# Patient Record
Sex: FEMALE
[De-identification: names, ages, dates, MRNs, and addresses within clinical notes are randomized per-mention and may not be internally consistent; named-entity substitution may affect disease eponyms.]

---

## 2017-04-27 ENCOUNTER — HOSPITAL ENCOUNTER (EMERGENCY)
Dept: HOSPITAL 25 - UCCORT | Age: 22
Discharge: HOME | End: 2017-04-27
Payer: COMMERCIAL

## 2017-04-27 VITALS — DIASTOLIC BLOOD PRESSURE: 70 MMHG | SYSTOLIC BLOOD PRESSURE: 129 MMHG

## 2017-04-27 DIAGNOSIS — I10: ICD-10-CM

## 2017-04-27 DIAGNOSIS — J02.0: Primary | ICD-10-CM

## 2017-04-27 DIAGNOSIS — E66.9: ICD-10-CM

## 2017-04-27 DIAGNOSIS — Z88.1: ICD-10-CM

## 2017-04-27 DIAGNOSIS — Z88.2: ICD-10-CM

## 2017-04-27 DIAGNOSIS — Z90.49: ICD-10-CM

## 2017-04-27 PROCEDURE — G0463 HOSPITAL OUTPT CLINIC VISIT: HCPCS

## 2017-04-27 PROCEDURE — 87651 STREP A DNA AMP PROBE: CPT

## 2017-04-27 PROCEDURE — 99202 OFFICE O/P NEW SF 15 MIN: CPT

## 2017-04-27 NOTE — UC
Throat Pain/Nasal Etienne HPI





- HPI Summary


HPI Summary: 





fever and sore throat for 2 days





- History of Current Complaint


Chief Complaint: UCRespiratory


Stated Complaint: SORE THROAT


Time Seen by Provider: 04/27/17 20:21


Hx Obtained From: Patient


Hx Last Menstrual Period: 3/16/17


Pregnant?: No


Onset/Duration: Sudden Onset, Lasting Days


Severity: Moderate


Cough: Nonproductive


Associated Signs & Symptoms: Positive: Dysphagia, Fever





- Epiglottits Risk Factors


Epiglottis Risk Factors: Negative





- Allergies/Home Medications


Allergies/Adverse Reactions: 


 Allergies











Allergy/AdvReac Type Severity Reaction Status Date / Time


 


Amoxicillin Allergy  Rash Verified 04/27/17 19:59


 


Sulfa Antibiotics Allergy  Rash Verified 04/27/17 19:59











Home Medications: 


 Home Medications





GuaiFENesin DM* [Robitussin DM*] 10 ml PO Q4H PRN 04/27/17 [History Confirmed 04 /27/17]


Ibuprofen TAB* [Motrin TAB* 800 MG] 800 mg PO TID 04/27/17 [History Confirmed 04 /27/17]


Losartan TAB* [Cozaar TAB*] 50 mg PO QPM 04/27/17 [History Confirmed 04/27/17]


Menthol (Mouth-Throat) [Cepacol Sore Throat] 10.6 mg MT ONCE 04/27/17 [History 

Confirmed 04/27/17]


Norgestimate-Ethinyl Estradiol [Trinessa 0.18/0.215/0.25 mg-35 Mcg] 1 tab PO 

QPM 04/27/17 [History Confirmed 04/27/17]


Propranolol TAB* [Inderal TAB*] 10 mg PO QPM 04/27/17 [History Confirmed 04/27/ 17]


buPROPion TAB* [Wellbutrin TAB*] 100 mg PO QPM 04/27/17 [History Confirmed 04/27 /17]











PMH/Surg Hx/FS Hx/Imm Hx


Previously Healthy: Yes


Cardiovascular History Of: Reports: Hypertension





- Surgical History


Surgical History: Yes


Surgery Procedure, Year, and Place: gallbladder 8/2016





- Family History


Known Family History: 


   Negative: Cardiac Disease





- Social History


Alcohol Use: Rare


Substance Use Type: None


Smoking Status (MU): Never Smoked Tobacco





Review of Systems


Constitutional: Fever


Skin: Negative


Eyes: Negative


ENT: Nasal Discharge


Respiratory: Cough


Cardiovascular: Negative


Gastrointestinal: Negative


Genitourinary: Negative


Motor: Negative


Neurovascular: Negative


Musculoskeletal: Negative


Neurological: Negative


Psychological: Negative


All Other Systems Reviewed And Are Negative: Yes





Physical Exam


Triage Information Reviewed: Yes


Appearance: Ill-Appearing, Pain Distress, Obese


Vital Signs: 


 Initial Vital Signs











Temp  100.4 F   04/27/17 19:52


 


Pulse  92   04/27/17 19:52


 


Resp  20   04/27/17 19:52


 


BP  129/70   04/27/17 19:52


 


Pulse Ox  100   04/27/17 19:52











Vital Signs Reviewed: Yes


Eye Exam: Normal


Eyes: Positive: Conjunctiva Clear


ENT: Positive: Normal ENT inspection, Hearing grossly normal, Pharynx normal, 

TMs normal


Dental Exam: Normal


Neck exam: Normal


Neck: Positive: Supple, Nontender, No Lymphadenopathy


Respiratory Exam: Normal


Respiratory: Positive: Chest non-tender, Lungs clear, Normal breath sounds


Cardiovascular Exam: Normal


Cardiovascular: Positive: RRR, No Murmur, Pulses Normal


Abdominal Exam: Normal


Abdomen Description: Positive: Nontender, No Organomegaly, Soft


Bowel Sounds: Positive: Present


Musculoskeletal Exam: Normal


Musculoskeletal: Positive: Strength Intact, ROM Intact, No Edema


Neurological Exam: Normal


Neurological: Positive: Alert, Muscle Tone Normal


Psychological Exam: Normal


Skin Exam: Normal





Throat Pain/Nasal Course/Dx





- Course


Course Of Treatment: hx obtained, exam performed, meds reviewed, rapid strep is 

positive, treated with keflex





- Differential Dx/Diagnosis


Differential Diagnosis/HQI/PQRI: Influenza, Laryngitis, Peritonsillar Abscess, 

Pharyngitis, Sinusitis


Provider Diagnoses: strep pharyngitis





Discharge





- Discharge Plan


Condition: Stable


Disposition: HOME


Prescriptions: 


Cephalexin CAP* [Keflex CAP*] 500 mg PO BID #20 cap


Patient Education Materials:  Strep Throat (ED)


Referrals: 


Veronica Wong MD [Primary Care Provider] - 


Additional Instructions: 


1. take the medication as prescribed


2. Increase your fluid intake and get plenty of rest.

## 2019-01-08 ENCOUNTER — HOSPITAL ENCOUNTER (EMERGENCY)
Dept: HOSPITAL 25 - UCCORT | Age: 24
Discharge: HOME | End: 2019-01-08
Payer: COMMERCIAL

## 2019-01-08 VITALS — DIASTOLIC BLOOD PRESSURE: 77 MMHG | SYSTOLIC BLOOD PRESSURE: 144 MMHG

## 2019-01-08 DIAGNOSIS — Z20.2: Primary | ICD-10-CM

## 2019-01-08 DIAGNOSIS — Z88.1: ICD-10-CM

## 2019-01-08 DIAGNOSIS — Z88.0: ICD-10-CM

## 2019-01-08 PROCEDURE — G0463 HOSPITAL OUTPT CLINIC VISIT: HCPCS

## 2019-01-08 PROCEDURE — 99211 OFF/OP EST MAY X REQ PHY/QHP: CPT

## 2019-01-08 PROCEDURE — 87591 N.GONORRHOEAE DNA AMP PROB: CPT

## 2019-01-08 PROCEDURE — 87491 CHLMYD TRACH DNA AMP PROBE: CPT

## 2019-01-08 NOTE — XMS REPORT
Continuity of Care Document (CCD)

 Created on:2018



Patient:Debra Nowak

Sex:Female

:1995

External Reference #:[id number]





Demographics







 Address  [address] [place] [address]

 

 Home Phone  [phone]

 

 Mobile Phone  [phone]

 

 Email Address  [email or web address]

 

 Preferred Language  en

 

 Marital Status  Not  or 

 

 Voodoo Affiliation  Unknown

 

 Race  White

 

 Ethnic Group  Not  or 









Author







 Name  Veronica Wong MD

 

 Address  [address] [place] [address]









Support







 Name  Relationship  Address  Phone

 

 Eliu Breonna  Unavailable  Unavailable  [phone]

 

 Eliu Matthieu  Unavailable  Unavailable  [phone]









Care Team Providers







 Name  Role  Phone

 

 Veronica Wong MD  Care Team Information   Unavailable









Payers







 Type  Date  Identification Numbers  Payment Provider  Subscriber

 

   Effective: 2013  Policy Number: [id number]  [facility] [employer]  Qi Echeverria









 PayID: [id number]  [address] [address]









 [place] [address]







Advance Directives







 Description

 

 No Information Available







Problems







 Date  Description  Provider  Status

 

 Onset: 2014  Headache  Veronica Wong MD  Active

 

 Onset: 2011  Mild major depression, single episode  Veronica Wong MD  
Active

 

 Onset: 2011  Impaired fasting glycaemia  Veronica Wong MD  Active

 

 Onset: 2015  Essential hypertension  Veronica Wong MD  Active







Family History







 Date  Family Member(s)  Problem(s)  Comments

 

   Father  Good Health  

 

   Mother  Good Health  

 

   Siblings  None  

 

   Paternal Grandmother  Diabetes, Adult  Diabetes Mellitus,II







Social History







 Type  Date  Description  Comments

 

 Birth Sex    Unknown  

 

 Education    Highest level completed,  from [employer] in early



     Associates Degree  childhood education

 

 Marital Status    Single  

 

 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

 

 Tobacco Use  Start: Unknown  Never Smoked Cigarettes  

 

 Smoking Status  Reviewed: 17  Never Smoked Cigarettes  

 

 Tobacco Use  Start: Unknown  Patient has never smoked  

 

 Seat Belt/Car Seat    always uses seat belt  

 

 Bike Helmet    Always  







Allergies, Adverse Reactions, Alerts







 Date  Description  Reaction  Status  Severity  Comments

 

 2015  Sulfa Drugs    Active    

 

 2015  Ramipril    Active    

 

 02/10/2016  Amoxicillin  HIVES  Active    







Medications







 Medication  Date  Status  Form  Strength  Qnty  SIG  Indications  Ordering



                 Provider

 

 Norgestim-Eth  /  Active  Tablets  0.18/0.21  84tab  take 1    Marvin



 Estrad Triphasic  2018      5/0.25  s  tablet by    MD Veronica



         mg-25 mcg    mouth every    



             day    

 

 Esomeprazole  /  Active  Capsules  40mg  90cap  1 by mouth  R10.84  
Marvin



 Magnesium      DR    s  every day    MD Veronica

 

 Bupropion HCL ER  /  Active  Tablets ER  300mg  90tab  take 1  F32.0  
Marvin



 (XL)      24HR    s  tablet by    MD Veronica



             mouth one    



             time daily    

 

 Propranolol HCL  /  Active  Caps ER  60mg  90cap  1 by mouth    SUPRIYA Wong      24HR    s  daily for    MD Veronica



             headache    



             prevention    

 

 Losartan  /  Active  Tablets  50mg  90tab  take 1    Marvin



 Potassium          s  tablet by    MD Veronica



             mouth every    



             day    

 

 Zyrtec  /  Active  Capsules  10mg    1 po qd prn    Marvin,



                 MD Veronica

 

 Ibuprofen  /  Active  Tablets  200mg  180ta  2-4 po prn    Murali,



   0000        bs      MD Fabian

 

                 

 

 Azithromycin  /  Hx  Tablets  250mg  6tabs  2 tablets  J01.00  Oscar



    -          by mouth on    Maninder,



   /          day 1 then    DO



             1 tablet on    



             days 2-5    

 

 Ondansetron HCL  /  Hx  Tablets  4mg  14tab  1 tablet by  R10.84  Oscar



    -        s  mouth three    Maninder,



   /          times a day    DO



   2018          as needed    

 

 Cholestyramine  /  Hx  Powder      1 packet  R19.7  Marvin



    -          qid prn    MD Veronica



   /          until sx    



   2018          are    



             relieved    

 

 Azithromycin  /  Hx  Tablets  250mg  6tabs  2 by mouth  R05  Marvin



    -          today, then    MD Veronica



   /          1 by mouth    



             daily x 4    



             more days    

 

 Cefprozil  02/10/  Hx  Tablets  500mg  20tab  1 by mouth  J01.90  Digiovann



   2016 -        s  twice a day    a,



   /          for 10 days    Cori,



   2016              NP

 

 Bupropion HCL ER  /  Hx  Tablets ER  150mg  30tab  take 1    Marvin



 (XL)   -    24HR    s  tablet by    MD Veronica



   /          mouth every    



             day    

 

 Trinessa (28)  /  Hx  Tablets  0.18/0.21  28tab  take 1    Digiovann



    -      5/0.25  s  tablet by    kenna



   /      mg-35 mcg    mouth every    ,



             day    MD

 

 [place] MARVIN  /  Hx  Tablets ER  150mg  30tab  take 1    Marvin



    -    24HR    s  tablet by    MD Veronica



   /          mouth every    



             day    

 

 Prilosec OTC  10/05/  Hx  Tablets DR  20mg    1 po qd prn    Marvin



    -              MD Veronica



   2018              

 

 Ibuprofen  /  Hx  Tablets  200mg    prn    Marvin



    -              MD Veronica



   2015              







Medications Administered in Office







 Medication  Date  Status  Form  Strength  Qnty  SIG  Indications  Ordering



                 Provider

 

 PPD    Administered  Injection          Rachael Wong MD

 

 PPD  10/06/201  Administered  Injection          Alex Wong MD







Immunizations







 CPT Code  Status  Date  Vaccine  Reaction  Lot #

 

 [id number]  Given  2018  Influenza Virus    



       Vaccine,Quadrivalent,Split,Preserv    



       Free 3 Yrs+    

 

   Given  2016  Flu Vaccine NOS    

 

 04080  Given  2013  Afluria Or Fluvirin Flu Vac    



       Intramuscular    

 

 12566  Given  2013  Menactra/Menveo Meningococcal  VIS DATE 10/14/11  



       Vaccine    

 

 82666  Given  2012  Afluria Or Fluvirin Flu Vac  VIS DATE 12  



       Intramuscular    

 

 47899  Given  2011  Afluria Or Fluvirin Flu Vac  VIS DATE 11  



       Intramuscular    

 

 80471  Given  10/05/2010  Afluria Or Fluvirin Flu Vac    



       Intramuscular    

 

 89884  Given  2007  Tdap (Adacel) Ages 7 And Above    



       Only    

 

 54667  Given  2007  Tdap (Adacel) Ages 7 And Above    



       Only    

 

 55971  Given  2000  DTP Immunization    

 

 51105  Given  1999  Hib ACTHiB Vaccine 4 Dose Schedule    

 

 41637  Given  1999  DTP Immunization    

 

 60552  Given  1999  MMR Virus Immunization    

 

 52590  Given  03/15/1996  MMR Virus Immunization    

 

 24178  Given  12/15/1995  Hepatitis B Vac Ped/Adolescent 3    



       Dose Schedule    

 

 13219  Given  1995  Oral Poliovirus Immunization    

 

 59984  Given  1995  DTP Immunization    

 

 94469  Given  1995  Hib ACTHiB Vaccine 4 Dose Schedule    

 

 24727  Given  1995  Hepatitis B Vac Ped/Adolescent 3    



       Dose Schedule    

 

 12041  Given  1995  Oral Poliovirus Immunization    

 

 29649  Given  1995  DTP Immunization    

 

 74400  Given  1995  Hib ACTHiB Vaccine 4 Dose Schedule    

 

 60002  Given  1995  Hepatitis B Vac Ped/Adolescent 3    



       Dose Schedule    

 

 75322  Given  1995  Oral Poliovirus Immunization    

 

 56162  Given  1995  DTP Immunization    









 









 40417  Refused  2018  HPV Vaccine (Gardasil) 3 Dose Schedule    

 

   Refused  2018  Flu Vaccine NOS    

 

 84262  Refused  04/10/2015  HPV Vaccine (Gardasil) 3 Dose Schedule    

 

 64721  Refused  04/10/2015  Varicella (Chicken Pox) Immunization    







Vital Signs







 Date  Vital  Result  Comment

 

 2018  2:45pm  Weight  356.00 lb  









 Heart Rate  93 /min  

 

 BP Systolic  130 mmHg  

 

 BP Diastolic  80 mmHg  

 

 Respiratory Rate  18 /min  

 

 Height  70.5 inches  5'10.50"

 

 O2 % BldC Oximetry  98 %  Ra

 

 BMI (Body Mass Index)  50.4 kg/m2  









 2018  3:50pm  Weight  360.00 lb  









 Heart Rate  76 /min  

 

 BP Systolic  122 mmHg  

 

 BP Diastolic  70 mmHg  

 

 Respiratory Rate  18 /min  

 

 Height  70.5 inches  5'10.50" 18

 

 BMI (Body Mass Index)  50.9 kg/m2  









 2018 10:13am  Body Temperature  99.2 F  









 Weight  348.00 lb  

 

 Heart Rate  80 /min  

 

 BP Systolic  122 mmHg  

 

 BP Diastolic  82 mmHg  

 

 Respiratory Rate  18 /min  

 

 Height  70.5 inches  5'10.50" 17]

 

 BMI (Body Mass Index)  49.2 kg/m2  









 2017  4:13pm  Body Temperature  99.4 F  tympanic









 Weight  360.00 lb  

 

 Heart Rate  88 /min  

 

 BP Systolic  136 mmHg  

 

 BP Diastolic  78 mmHg  

 

 Respiratory Rate  18 /min  

 

 Height  70.5 inches  5'10.50" 17]

 

 BMI (Body Mass Index)  50.9 kg/m2  









 2017  1:13pm  Weight  366.00 lb  









 Heart Rate  76 /min  

 

 BP Systolic  122 mmHg  

 

 BP Diastolic  80 mmHg  

 

 Respiratory Rate  18 /min  

 

 Height  70.5 inches  5'10.50" 17]

 

 BMI (Body Mass Index)  51.8 kg/m2  









 2017  9:27am  Body Temperature  98.4 F  









 Weight  346.00 lb  

 

 Heart Rate  78 /min  

 

 BP Systolic  122 mmHg  

 

 BP Diastolic  70 mmHg  

 

 Respiratory Rate  18 /min  

 

 Height  70.5 inches  5'10.50" 17]

 

 BMI (Body Mass Index)  48.9 kg/m2  









 2017 10:02am  Weight  353.00 lb  









 Heart Rate  80 /min  

 

 BP Systolic  122 mmHg  

 

 BP Diastolic  80 mmHg  

 

 Respiratory Rate  18 /min  

 

 Height  70.5 inches  5'10.50" 17]

 

 BMI (Body Mass Index)  49.9 kg/m2  









 2017 10:48am  Weight  340.00 lb  









 Heart Rate  80 /min  

 

 BP Systolic  130 mmHg  

 

 BP Diastolic  88 mmHg  

 

 Respiratory Rate  80 /min  

 

 Height  70.5 inches  5'10.50" 17]

 

 BMI (Body Mass Index)  48.1 kg/m2  









 11/10/2016  9:05am  Weight  340.00 lb  









 Heart Rate  76 /min  

 

 BP Systolic  122 mmHg  

 

 BP Diastolic  72 mmHg  

 

 Respiratory Rate  18 /min  

 

 Height  70.5 inches  5'10.50" 11/10/16

 

 BMI (Body Mass Index)  48.1 kg/m2  









 2016  9:11am  Weight  347.00 lb  









 Heart Rate  84 /min  

 

 BP Systolic  112 mmHg  

 

 BP Diastolic  80 mmHg  

 

 Height  70.5 inches  5'10.50"

 

 BMI (Body Mass Index)  49.1 kg/m2  









 2016  9:01am  Body Temperature  98.7 F  









 Weight  343.00 lb  

 

 Heart Rate  72 /min  

 

 BP Systolic  128 mmHg  

 

 BP Diastolic  80 mmHg  

 

 Respiratory Rate  18 /min  

 

 Height  70.5 inches  5'10.50"

 

 BMI (Body Mass Index)  48.5 kg/m2  









 2016  3:40pm  Body Temperature  98.9 F  









 Weight  346.00 lb  

 

 Heart Rate  70 /min  

 

 BP Systolic  128 mmHg  

 

 BP Diastolic  74 mmHg  

 

 Respiratory Rate  18 /min  

 

 Height  70.5 inches  5'10.50"

 

 BMI (Body Mass Index)  48.9 kg/m2  









 2016  9:25am  Body Temperature  97.5 F  









 Weight  346.00 lb  

 

 Heart Rate  68 /min  

 

 BP Systolic  122 mmHg  

 

 BP Diastolic  60 mmHg  

 

 Respiratory Rate  18 /min  

 

 Height  70.5 inches  5'10.50"

 

 BMI (Body Mass Index)  48.9 kg/m2  









 02/10/2016  2:25pm  Body Temperature  98.7 F  









 Weight  340.00 lb  

 

 Heart Rate  74 /min  

 

 BP Systolic  138 mmHg  

 

 BP Diastolic  74 mmHg  

 

 Respiratory Rate  18 /min  

 

 Height  70.5 inches  5'10.50"

 

 O2 % BldC Oximetry  99 %  Ra

 

 BMI (Body Mass Index)  48.1 kg/m2  









 12/15/2015  9:15am  Body Temperature  97.6 F  









 Weight  332.00 lb  

 

 BP Systolic  140 mmHg  

 

 BP Diastolic  70 mmHg  

 

 Respiratory Rate  18 /min  

 

 Height  70.5 inches  5'10.50"

 

 BMI (Body Mass Index)  47.0 kg/m2  









 2015  9:34am  Weight  332.00 lb  









 Heart Rate  80 /min  

 

 BP Systolic  132 mmHg  

 

 BP Diastolic  80 mmHg  

 

 Respiratory Rate  18 /min  

 

 Height  70.5 inches  5'10.50"

 

 BMI (Body Mass Index)  47.0 kg/m2  









 10/06/2015  2:41pm  Weight  338.00 lb  









 Heart Rate  68 /min  

 

 BP Systolic  132 mmHg  

 

 BP Diastolic  80 mmHg  

 

 Respiratory Rate  18 /min  

 

 Height  70.5 inches  5'10.50"

 

 BMI (Body Mass Index)  47.8 kg/m2  









 10/06/2015  2:38pm  Height  70.5 inches  5'10.50"

 

 2015  9:51am  Weight  324.00 lb  









 Heart Rate  68 /min  

 

 BP Systolic  126 mmHg  

 

 BP Diastolic  86 mmHg  

 

 Respiratory Rate  18 /min  

 

 Height  70.5 inches  5'10.50"

 

 BMI (Body Mass Index)  45.8 kg/m2  









 04/10/2015 11:16am  Weight  318.00 lb  









 Height  70.5 inches  5'10.50"

 

 BMI (Body Mass Index)  45.0 kg/m2  









 2015 10:23am  Weight  303.00 lb  









 Heart Rate  66 /min  

 

 BP Systolic  124 mmHg  

 

 BP Diastolic  74 mmHg  

 

 Respiratory Rate  18 /min  

 

 Height  70.5 inches  5'10.50"

 

 BMI (Body Mass Index)  42.9 kg/m2  









 2014 10:08am  BP Systolic  120 mmHg  









 BP Diastolic  70 mmHg  









 2014 10:08am  Weight  274.00 lb  









 Heart Rate  76 /min  

 

 BP Systolic  122 mmHg  

 

 BP Diastolic  90 mmHg  

 

 Respiratory Rate  18 /min  

 

 Height  70.25 inches  5'10.25"









 2014  9:15am  Weight  274.00 lb  









 Heart Rate  76 /min  

 

 BP Systolic  132 mmHg  

 

 BP Diastolic  80 mmHg  

 

 Respiratory Rate  18 /min  

 

 Height  70.25 inches  5'10.25"









 2014  2:43pm  Weight  310.38 lb  









 Heart Rate  84 /min  

 

 BP Systolic  130 mmHg  

 

 BP Diastolic  84 mmHg  

 

 Respiratory Rate  18 /min  

 

 Height  70.25 inches  5'10.25"









 2014  9:06am  Weight  318.00 lb  









 Heart Rate  80 /min  

 

 BP Systolic  130 mmHg  

 

 BP Diastolic  80 mmHg  

 

 Respiratory Rate  18 /min  

 

 Height  70.25 inches  5'10.25"









 2014  2:51pm  Weight  347.00 lb  









 Heart Rate  80 /min  

 

 BP Systolic  134 mmHg  

 

 BP Diastolic  84 mmHg  

 

 Respiratory Rate  21 /min  

 

 Height  70.25 inches  5'10.25"









 2014  1:49pm  Body Temperature  98.9 F  









 Weight  337.00 lb  

 

 Heart Rate  80 /min  

 

 BP Systolic  144 mmHg  

 

 BP Diastolic  92 mmHg  

 

 Respiratory Rate  20 /min  

 

 Height  70.25 inches  5'10.25"

 

 O2 % BldC Oximetry  98 %  









 2014  8:44am  BP Systolic  122 mmHg  









 BP Diastolic  80 mmHg  









 2014  8:44am  Weight  355.00 lb  









 Heart Rate  72 /min  

 

 BP Systolic  146 mmHg  

 

 BP Diastolic  90 mmHg  

 

 Respiratory Rate  24 /min  









 12/10/2013  3:43pm  BP Systolic  132 mmHg  









 BP Diastolic  82 mmHg  









 12/10/2013  3:43pm  Weight  359.00 lb  









 Heart Rate  80 /min  

 

 BP Systolic  156 mmHg  

 

 BP Diastolic  92 mmHg  

 

 Respiratory Rate  18 /min  

 

 Height  70.25 inches  5'10.25" (Done On 13)









 2013  1:25pm  BP Systolic  150 mmHg  









 BP Diastolic  90 mmHg  









 2013  1:25pm  Weight  362.00 lb  









 Heart Rate  104 /min  

 

 BP Systolic  154 mmHg  

 

 BP Diastolic  90 mmHg  

 

 Respiratory Rate  20 /min  

 

 Height  70.25 inches  5'10.25" (Done On 13)









 10/14/2013  1:38pm  BP Systolic  116 mmHg  









 BP Diastolic  80 mmHg  









 10/14/2013  1:38pm  Heart Rate  102 /min  









 BP Systolic  140 mmHg  

 

 BP Diastolic  100 mmHg  

 

 Respiratory Rate  20 /min  

 

 Height  70.25 inches  5'10.25" (Done On 13)









 2013  1:03pm  Weight  357.00 lb  









 Heart Rate  96 /min  

 

 BP Systolic  152 mmHg  

 

 BP Diastolic  90 mmHg  

 

 Respiratory Rate  20 /min  

 

 Height  70.25 inches  5'10.25" (Done On 13)









 2013  9:03am  Weight  361.00 lb  









 Heart Rate  88 /min  

 

 BP Systolic  130 mmHg  

 

 BP Diastolic  80 mmHg  

 

 Respiratory Rate  20 /min  

 

 Height  70.25 inches  5'10.25" (Done On 13)









 2013  2:55pm  BP Systolic  130 mmHg  









 BP Diastolic  80 mmHg  









 2013  2:55pm  Weight  365.00 lb  









 Heart Rate  88 /min  

 

 BP Systolic  136 mmHg  

 

 BP Diastolic  100 mmHg  

 

 Respiratory Rate  20 /min  

 

 Height  70.25 inches  5'10.25" (Done On 13)









 2013  2:39pm  BP Systolic  138 mmHg  









 BP Diastolic  90 mmHg  









 2013  2:39pm  Weight  356.00 lb  









 Heart Rate  104 /min  

 

 BP Systolic  154 mmHg  

 

 BP Diastolic  100 mmHg  

 

 Respiratory Rate  20 /min  

 

 Height  70.25 inches  5'10.25"









 2012  3:54pm  Body Temperature  99.0 F  









 Weight  347.00 lb  

 

 Heart Rate  102 /min  

 

 BP Systolic  146 mmHg  

 

 BP Diastolic  96 mmHg  

 

 Respiratory Rate  20 /min  

 

 Height  70.25 inches  5'10.25" (Done On 12)

 

 O2 % BldC Oximetry  98 %  









 10/18/2012  3:52pm  BP Systolic  142 mmHg  









 BP Diastolic  110 mmHg  









 10/18/2012  3:52pm  Weight  347.00 lb  









 Heart Rate  88 /min  

 

 BP Systolic  152 mmHg  

 

 BP Diastolic  100 mmHg  

 

 Respiratory Rate  20 /min  

 

 Height  70.25 inches  5'10.25" (Done On 12)









 2012  3:51pm  BP Systolic  120 mmHg  









 BP Diastolic  90 mmHg  









 2012  3:51pm  Weight  347.00 lb  









 Heart Rate  102 /min  

 

 BP Systolic  152 mmHg  

 

 BP Diastolic  98 mmHg  

 

 Respiratory Rate  20 /min  

 

 Height  70.25 inches  5'10.25" (Done On 12)









 2012  4:04pm  Body Temperature  99.5 F  









 Weight  342.00 lb  

 

 Heart Rate  84 /min  

 

 BP Systolic  148 mmHg  

 

 BP Diastolic  104 mmHg  

 

 Respiratory Rate  20 /min  

 

 Height  70.25 inches  5'10.25" (Done On 12)

 

 O2 % BldC Oximetry  96 %  









 2012  3:32pm  BP Systolic  130 mmHg  









 BP Diastolic  94 mmHg  









 2012  3:32pm  Body Temperature  99.3 F  









 Weight  345.00 lb  

 

 Heart Rate  88 /min  

 

 BP Systolic  144 mmHg  

 

 BP Diastolic  104 mmHg  

 

 Respiratory Rate  20 /min  

 

 Height  70.25 inches  5'10.25" (Done On 12)

 

 O2 % BldC Oximetry  96 %  









 2012  2:50pm  BP Systolic  130 mmHg  









 BP Diastolic  80 mmHg  









 2012  2:50pm  Weight  342.00 lb  









 Heart Rate  80 /min  

 

 BP Systolic  146 mmHg  

 

 BP Diastolic  96 mmHg  

 

 Respiratory Rate  20 /min  

 

 Height  70.25 inches  5'10.25" (Done On 12)









 2012  3:38pm  Body Temperature  98.8 F  









 Weight  341.00 lb  

 

 Heart Rate  74 /min  

 

 BP Systolic  150 mmHg  

 

 BP Diastolic  92 mmHg  

 

 Respiratory Rate  18 /min  

 

 Height  70.25 inches  5'10.25" (Done On 12)









 2012  3:02pm  BP Systolic  140 mmHg  









 BP Diastolic  82 mmHg  









 2012  3:02pm  Weight  338.00 lb  









 Heart Rate  100 /min  

 

 BP Systolic  152 mmHg  

 

 BP Diastolic  90 mmHg  

 

 Respiratory Rate  18 /min  

 

 Height  70.25 inches  5'10.25" (Done On 12)









 2012  4:07pm  Body Temperature  99.5 F  









 Weight  335.00 lb  

 

 Heart Rate  74 /min  

 

 BP Systolic  130 mmHg  

 

 BP Diastolic  84 mmHg  

 

 Respiratory Rate  18 /min  

 

 Height  70.25 inches  5'10.25"









 2012  3:04pm  BP Systolic  120 mmHg  









 BP Diastolic  82 mmHg  









 2012  3:04pm  Weight  328.00 lb  









 Heart Rate  102 /min  

 

 BP Systolic  148 mmHg  

 

 BP Diastolic  90 mmHg  

 

 Respiratory Rate  18 /min  

 

 Height  70.25 inches  5'10.25"









 2011  3:16pm  BP Systolic  132 mmHg  









 BP Diastolic  80 mmHg  









 2011  3:16pm  Weight  323.00 lb  









 Heart Rate  96 /min  

 

 BP Systolic  144 mmHg  

 

 BP Diastolic  94 mmHg  

 

 Respiratory Rate  18 /min  

 

 Height  70.25 inches  5'10.25" (Done On 11)









 2011  1:30pm  BP Systolic  140 mmHg  









 BP Diastolic  100 mmHg  









 2011  1:30pm  Weight  324.00 lb  









 Heart Rate  100 /min  

 

 BP Systolic  162 mmHg  

 

 BP Diastolic  100 mmHg  

 

 Respiratory Rate  18 /min  

 

 Height  70.25 inches  5'10.25" (Done On 11)









 10/19/2011  3:47pm  BP Systolic  136 mmHg  









 BP Diastolic  96 mmHg  









 10/19/2011  3:47pm  Body Temperature  98.4 F  









 Weight  329.00 lb  

 

 Heart Rate  76 /min  

 

 BP Systolic  152 mmHg  

 

 BP Diastolic  110 mmHg  

 

 Respiratory Rate  18 /min  

 

 Height  70.25 inches  5'10.25" (Done On 11)









 2011  2:53pm  BP Systolic  122 mmHg  









 BP Diastolic  88 mmHg  









 2011  2:53pm  Weight  322.00 lb  









 Heart Rate  96 /min  

 

 BP Systolic  138 mmHg  

 

 BP Diastolic  98 mmHg  

 

 Respiratory Rate  20 /min  

 

 Height  70.25 inches  5'10.25" (Done On 11)









 2011  9:41am  BP Systolic  128 mmHg  









 BP Diastolic  78 mmHg  









 2011  9:41am  Weight  326.00 lb  









 Heart Rate  100 /min  

 

 BP Systolic  160 mmHg  

 

 BP Diastolic  98 mmHg  

 

 Respiratory Rate  20 /min  

 

 Height  70.25 inches  5'10.25"









 2011  2:40pm  Weight  323.00 lb  









 Heart Rate  100 /min  

 

 BP Systolic  134 mmHg  

 

 BP Diastolic  84 mmHg  

 

 Respiratory Rate  20 /min  

 

 Height  70.25 inches  5'10.25"









 2010  3:00pm  Weight  322.00 lb  









 Heart Rate  88 /min  

 

 BP Systolic  140 mmHg  

 

 BP Diastolic  80 mmHg  

 

 Respiratory Rate  18 /min  









 10/05/2010  2:55pm  Weight  327.00 lb  









 Heart Rate  92 /min  

 

 BP Systolic  130 mmHg  

 

 BP Diastolic  80 mmHg  

 

 Respiratory Rate  20 /min  

 

 O2 % BldC Oximetry  99 %  









 2010  3:20pm  Weight  320.00 lb  









 Heart Rate  100 /min  

 

 BP Systolic  120 mmHg  

 

 BP Diastolic  80 mmHg  

 

 Respiratory Rate  18 /min  









 2010  4:17pm  BP Systolic  150 mmHg  









 BP Diastolic  90 mmHg  









 2010  4:17pm  Body Temperature  98.7 F  









 Weight  325.00 lb  

 

 Heart Rate  76 /min  

 

 BP Systolic  152 mmHg  

 

 BP Diastolic  90 mmHg  

 

 Respiratory Rate  20 /min  

 

 Height  69.75 inches  5'9.75"









 2009  4:00pm  Weight  300.00 lb  









 Heart Rate  92 /min  

 

 BP Systolic  140 mmHg  

 

 BP Diastolic  84 mmHg  

 

 Respiratory Rate  18 /min  

 

 Height  69.5 inches  5'9.50"









 03/10/2009  8:19am  BP Systolic  130 mmHg  









 BP Diastolic  88 mmHg  









 03/10/2009  8:19am  Body Temperature  100.6 F  









 Weight  303.00 lb  

 

 Heart Rate  104 /min  

 

 BP Systolic  154 mmHg  

 

 BP Diastolic  98 mmHg  

 

 Respiratory Rate  22 /min  









 2008  3:56pm  BP Systolic  126 mmHg  









 BP Diastolic  80 mmHg  









 2008  3:56pm  Weight  293.00 lb  









 Heart Rate  102 /min  

 

 BP Systolic  140 mmHg  

 

 BP Diastolic  98 mmHg  

 

 Respiratory Rate  18 /min  









 2007  3:41pm  Heart Rate  88 /min  









 BP Systolic  116 mmHg  

 

 BP Diastolic  88 mmHg  

 

 Respiratory Rate  16 /min  









 2007  4:24pm  Weight  244.00 lb  









 Heart Rate  98 /min  

 

 BP Systolic  136 mmHg  

 

 BP Diastolic  80 mmHg  

 

 Respiratory Rate  18 /min  









 2007 11:40am  Body Temperature  98.7 F  









 Weight  242.00 lb  

 

 Heart Rate  82 /min  

 

 BP Systolic  128 mmHg  

 

 BP Diastolic  70 mmHg  

 

 Respiratory Rate  18 /min  









 2007  4:30pm  Body Temperature  98.1 F  









 Weight  239.00 lb  

 

 Heart Rate  84 /min  

 

 BP Systolic  120 mmHg  

 

 BP Diastolic  80 mmHg  

 

 Respiratory Rate  16 /min  









 2006  3:29pm  Body Temperature  98.3 F  









 Weight  205.00 lb  









 2006  5:08pm  Body Temperature  97.7 F  









 Weight  198.00 lb  

 

 Heart Rate  80 /min  

 

 BP Systolic  114 mmHg  

 

 BP Diastolic  78 mmHg  

 

 Respiratory Rate  18 /min  









 2006  2:21pm  Weight  194.00 lb  









 Heart Rate  84 /min  

 

 BP Systolic  120 mmHg  

 

 BP Diastolic  90 mmHg  

 

 Height  65 inches  5'5"







Results







 Test  Date  Facility  Test  Result  H/L  Range  Note

 

 Hemoglobin A1c  2018  [facility]  Glycohemoglobin  5.7 
%  N  4.2-6.3  1, 2



     (315)-   -  (A1c)        









 eAG  117 mg/dL      









 Basic (BMP)  2018  [facility]  Glucose  99 mg/dL  N  74
-106  



     (315)-   -          









 BUN  17 mg/dL  N  7-18  

 

 Creatinine  0.9 mg/dL  N  0.6-1.3  

 

 Glom Filtration Rate, Estimate  >60 mL/min    >60  

 

 If African American  >60 mL/min    >60  3

 

 BUN/Creat  18.8 ratio      

 

 Sodium  140 mmol/L  N  136-145  

 

 Potassium  4.3 mmol/L  N  3.5-5.1  

 

 Chloride  104 mmol/L  N    

 

 Carbon Dioxide  28 mmol/L  N  21-32  

 

 Anion Gap  8 mEq/L  N  8-16  

 

 Calcium  8.8 mg/dL  N  8.5-10.1  









 CBC with Auto  2018  [facility]  White Blood  9.1 K/uL
  N  3.1-10.7  



 Diff-fcmg    (315)-   -  Count        









 Red Blood Count  4.44 M/uL  N  3.90-5.40  

 

 Hemoglobin  11.9 gm/dL  N  11.6-15.8  

 

 Hematocrit  37.5 %  N  36.0-46.1  

 

 Mean Cell Volume  84.5 fl  N  80.9-99.0  

 

 Mean Corpuscular HGB  26.8 pg  N  25.9-32.7  

 

 Mean Corpuscular HGB Conc  31.7 g/dL  N  30.8-34.3  

 

 Platelet Count  357 K/uL  N  155-360  

 

 Red Cell Distri Width SD  43.1 fl  N  3-47  

 

 Red Cell Distri Width %CV  14.2 %  N  11.7-14.4  

 

 Mean Platelet Volume  8.9 fL  N  8.9-12.4  

 

 Neut%  60.7 %  N  40.4-72.8  

 

 Lymph %  31.6 %  N  20.0-42.0  

 

 Mono %  6.3 %  N  4.3-13.2  

 

 Eo%  1.0 %  N  0.0-6.6  

 

 Bas%  0.4 %  N  0.0-1.1  

 

 Neut#  5.49 K/uL  N  1.8-7.0  

 

 Lymph #  2.86 K/uL  N  1.0-4.0  

 

 Mono #  0.57 K/uL  N  0.3-0.9  

 

 Eos #  0.09 K/uL  N  0.0-0.5  

 

 Baso #  0.04 K/uL  N  0.0-0.1  









 Laboratory test  2018  [facility]  Thyroid Stim  0.97 
uIU/mL  N  0.30-4.20  



 finding    (315)-   -  Hormone        

 

 Lipid  2018  [facility]  Cholesterol  189 mg/dL    <
200  4



     (315)-   -          









 Triglycerides  117 mg/dL    <150  5

 

 HDL Cholesterol  46 mg/dL    >40  6

 

 LDL-Cholesterol  120 mg/dL    < 100  7









 Laboratory test finding  2018  [facility]  H. Pylori Stool Ag  SEE NOTE    
  8

 

 Laboratory test finding  2018  [facility]  Lipase  9 U/L  Low  11-82  









 CRP (C-Reactive)  0.47 mg/dL    0.00-0.75  

 

 Esr  22 mm/hr  High  0-20  









 Basic (BMP)  2018  [facility]  Sodium  140 mmol/L    135-146  9









 Potassium  4.4 mmol/L    3.5-5.2  

 

 Chloride#  106 mmol/L      10

 

 Carbon Dioxide  26 mmol/L    24-34  

 

 Glucose  96 mg/dL      

 

 Creatinine  0.8 mg/dL    0.5-1.4  

 

 Calcium  9.4 mg/dL    8.5-10.2  

 

 Non  Gayathri Egfr  >60    >60  11

 

  Gayathri Egfr  >60    >60  12

 

 Anion Gap  8 mmol/L    7-16  13

 

 BUN  11 mg/dL    6-26  









 Hepatic Panel (LFT)  2018  [facility]  Total Protein  6.3 g/dL    6.0-8.0  









 Albumin  4.0 g/dL    3.6-4.9  

 

 Total Bilirubin  0.3 mg/dL    0.1-1.3  

 

 Direct Bilirubin  0.1 mg/dL    0.0-0.4  

 

 Alkaline Phosphatase  54 U/L      

 

 Alt  19 U/L    3-42  

 

 Ast  12 U/L    8-42  









 Celiac Disease Panel  2018  [facility]  Celikey (tTG) IgA  Negative    
Negative  









 Celikey (tTG) IgG  Negative    Negative  

 

 deamidated Gliadin IgA  Negative    Negative  

 

 deamidated Gliadin IgG  Negative    Negative  









 CBC With Auto Diff  2018  [facility]  WBC  10.6 K/uL    4.1-11.0  









 RBC  4.50 M/uL    4.00-5.40  

 

 Hemoglobin  12.5 gm/dL    12.0-16.0  

 

 Hematocrit  38.0 %    36.0-47.0  

 

 MCV  84.3 fL    80.0-97.0  

 

 MCH  27.7 pg    27.0-32.0  

 

 MCHC  32.8 g/dL    32.0-36.0  

 

 RDW  14.2 %    11.5-14.5  

 

 PLT Count  382 K/ul    140-400  

 

 MPV  7.5 FL    7.1-10.7  

 

 Neutrophil  69.2 %    35.0-75.0  

 

 Lymphocyte  24.3 %    16.0-52.0  

 

 Monocyte  5.3 %    2.0-10.0  

 

 Eosinophil  1.0 %    0.0-5.0  

 

 Basophil  0.2 %    0.0-4.0  

 

 Abs Neutrophils  7.3 K/uL    2.1-8.0  

 

 Abs Lymphocytes  2.6 K/uL    0.8-5.5  

 

 Abs Monocytes  0.6 K/uL    0.1-1.0  

 

 Abs Eosinophils  0.1 K/uL    0.0-0.5  

 

 Abs Basophils  0.0 K/uL    0.0-0.3  









 CBC With Auto  2017  [facility]  White Blood  8.5 K/uL
  N  3.1-10.7  14



 Diff    (315)-   -  Count        









 Red Blood Count  4.36 M/uL  N  3.90-5.40  

 

 Hemoglobin  12.1 gm/dL  N  11.6-15.8  

 

 Hematocrit  37.8 %  N  36.0-46.1  

 

 Mean Cell Volume  86.7 fl  N  80.9-99.0  

 

 Mean Corpuscular HGB  27.8 pg  N  25.9-32.7  

 

 Mean Corpuscular HGB Conc  32.0 g/dL  N  30.8-34.3  

 

 Platelet Count  340 K/uL  N  150-400  

 

 Red Cell Distri Width SD  41.2 fl  N  3-47  

 

 Red Cell Distri Width %CV  13.4 %  N  11.7-14.4  

 

 Mean Platelet Volume  9.4 fL  N  8.9-12.4  

 

 Neut%  45.8 %  N  40.4-72.8  

 

 Lymph %  43.5 %  High  20.0-42.0  

 

 Mono %  7.7 %  N  4.3-13.2  

 

 Eo%  2.8 %  N  0.0-6.6  

 

 Bas%  0.2 %  N  0.0-1.1  

 

 Neut#  3.89 K/uL  N  1.8-7.0  

 

 Lymph #  3.71 K/uL  N  1.0-4.0  

 

 Mono #  0.66 K/uL  N  0.3-0.9  

 

 Eos #  0.24 K/uL  N  0.0-0.5  

 

 Baso #  0.02 K/uL  N  0.0-0.1  









 CMP, Comp Metabolic  2017  [facility]  Glucose  98 mg/
dL  N    



 Panel    (315)-   -          









 BUN  14 mg/dL  N  7-18  

 

 Creatinine  0.9 mg/dL  N  0.6-1.3  

 

 Glom Filtration Rate, Estimate  >60 mL/min    >60  

 

 If African American  >60 mL/min    >60  15

 

 BUN/Creat  15.5 ratio      

 

 Sodium  140 mmol/L  N  136-145  

 

 Potassium  3.8 mmol/L  N  3.5-5.1  

 

 Chloride  107 mmol/L  N    

 

 Carbon Dioxide  23 mmol/L  N  21-32  

 

 Anion Gap  10 mEq/L  N  8-16  

 

 Calcium  8.5 mg/dL  N  8.5-10.1  

 

 Total Protein  6.6 g/dL  N  6.4-8.2  

 

 Albumin  3.0 g/dL  Low  3.4-5.0  

 

 Globulin  3.6 g/dL  N  1.9-4.3  

 

 Alb/Glob  0.8 ratio      

 

 Bilirubin,Total  0.2 mg/dL  N  0.2-1.0  

 

 Sgot/Ast  13 U/L  Low  15-37  16

 

 SGPT/Alt  19 U/L  N  12-78  

 

 Alkaline Phosphatase  63 U/L  N    









 Laboratory test  2017  [facility]  Thyroid Stim  1.17  
N  0.30-4.20  



 finding    (315)-   -  Hormone  uIU/mL      

 

 Hemoglobin  2017  [facility]  Glycohemoglobin  5.4 %  
N  4.2-6.3  17



 [id number]    (315)-   -  (A1c)        









 eAG  108 mg/dL      









 Laboratory test  2017  [facility]  GC Chlamydia Dna  neg      



 finding              

 

 Basic Metabolic  2016  [facility]  Glucose  85 mg/dL  
N    18



 Panel    (315)-   -          









 BUN  10 mg/dL  N  7-18  

 

 Creatinine  0.8 mg/dL  N  0.6-1.3  

 

 Glom Filtration Rate, Estimate  >60 mL/min  N  >60  

 

 If African American  >60 mL/min  N  >60  19

 

 BUN/Creat  12.5 ratio  N    

 

 Sodium  140 mmol/L  N  136-145  

 

 Potassium  4.1 mmol/L  N  3.5-5.1  

 

 Chloride  107 mmol/L  N    

 

 Carbon Dioxide  26 mmol/L  N  21-32  

 

 Anion Gap  7 mEq/L  Low  8-16  

 

 Calcium  8.9 mg/dL  N  8.5-10.1  









 Hemoglobin A1c  2016  [facility]  Glycohemoglobin (A1c
)  5.3 %  N  4.2-6.3  20



     (315)-   -          









 eAG  105 mg/dL  N    









 Basic (BMP)  2016  [facility]  Sodium  138 mmol/L    134-142  21









 Potassium  4.9 mmol/L    3.5-5.2  

 

 Chloride  103 mmol/L      

 

 Carbon Dioxide  30 mmol/L    24-34  

 

 Glucose  91 mg/dL      

 

 BUN  13 mg/dL    6-26  

 

 Creatinine  0.7 mg/dL    0.5-1.4  

 

 Calcium  9.3 mg/dL    8.5-10.2  

 

 Anion Gap  10 mmol/L    6-14  

 

 Non  Gayathri Egfr  >60    >60  22

 

  Gayathri Egfr  >60    >60  23









 Laboratory test finding  2016  [facility]  Hemoglobin A1c  5.5 %    4.1-5.9
  

 

 Hepatic Panel (LFT)  2016  [facility]  Total Protein  6.5 g/dL    6.0-8.0  









 Albumin  3.9 g/dL    3.6-4.9  

 

 Total Bilirubin  0.5 mg/dL    0.1-1.3  

 

 Direct Bilirubin  0.1 mg/dL    0.0-0.4  

 

 Alkaline Phosphatase  64 U/L      

 

 Alt  40 U/L    3-42  

 

 Ast  16 U/L    8-42  









 Laboratory test finding  2016  [facility]  Lipase  19 U/L    11-82  

 

 CBC With Auto Diff  2016  [facility]  WBC  7.9 K/uL    4.1-11.0  









 RBC  4.57 M/uL    4.00-5.40  

 

 Hemoglobin  12.7 gm/dL    12.0-16.0  

 

 Hematocrit  38.9 %    36.0-47.0  

 

 MCV  85.1 fL    80.0-97.0  

 

 MCH  27.7 pg    27.0-32.0  

 

 MCHC  32.6 g/dL    32.0-36.0  

 

 RDW  14.1 %    11.5-14.5  

 

 PLT Count  334 K/ul    140-400  

 

 Neutrophil  59.2 %    35.0-75.0  

 

 Lymphocyte  30.6 %    16.0-52.0  

 

 Monocyte  7.5 %    2.0-10.0  

 

 Eosinophil  2.1 %    0.0-5.0  

 

 Basophil  0.6 %    0.0-4.0  

 

 Abs Neutrophils  4.7 K/uL    2.1-8.0  

 

 Abs Lymphocytes  2.4 K/uL    0.8-5.5  

 

 Abs Monocytes  0.6 K/uL    0.1-1.0  

 

 Abs Eosinophils  0.2 K/uL    0.0-0.5  

 

 Abs Basophils  0.0 K/uL    0.0-0.3  









 Basic Metabolic Panel  2016  [facility]  Glucose  97 mg
/dL      



     (315)-   -          









 BUN  6 mg/dL  Low  7-18  

 

 Creatinine  0.8 mg/dL    0.6-1.3  

 

 Glom Filtration Rate, Estimate  >60 mL/min    >60  

 

 If African American  >60 mL/min    >60  24

 

 BUN/Creat  7.5 ratio      

 

 Sodium  142 mmol/L    136-145  

 

 Potassium  3.6 mmol/L    3.5-5.1  

 

 Chloride  108 mmol/L  High    

 

 Carbon Dioxide  27 mmol/L    21-32  

 

 Anion Gap  7 mEq/L  Low  8-16  

 

 Calcium  8.4 mg/dL  Low  8.5-10.1  









 Liver Function  2016  [facility]  Total Protein  6.4 g/
dL    6.4-8.2  



 Tests    (315)-   -          









 Albumin  3.0 g/dL  Low  3.4-5.0  

 

 Globulin  3.4 g/dL    1.9-4.3  

 

 Alb/Glob  0.9 ratio      

 

 Bilirubin,Total  0.7 mg/dL    0.2-1.0  

 

 Bilirubin,Direct  0.4 mg/dL  High  0.0-0.2  

 

 Bilirubin,Indirect  0.3 mg/dL    0.0-0.9  

 

 Sgot/Ast  78 U/L  High  15-37  

 

 SGPT/Alt  194 U/L  High  12-78  

 

 Alkaline Phosphatase  88 U/L      









 Laboratory test  2016  [facility]  Pathology  See Note
      25



 finding    (315)-   -  Specimen        

 

 CBC  08/10/2016  [facility]  White Blood  8.0 K/uL    3.1-
10.  



     (315)-   -  Count      7  









 Red Blood Count  4.03 M/uL    3.90-5.40  

 

 Hemoglobin  11.3 gm/dL  Low  11.6-15.8  

 

 Hematocrit  35.0 %  Low  36.0-46.1  

 

 Mean Cell Volume  86.8 fl    80.9-99.0  

 

 Mean Corpuscular HGB  28.0 pg    25.9-32.7  

 

 Mean Corpuscular HGB Conc  32.3 g/dL    30.8-34.3  

 

 Platelet Count  276 K/uL    155-360  

 

 Red Cell Distri Width %CV  13.9 %    11.7-14.4  

 

 Mean Platelet Volume  9.2 fL    8.9-12.4  









 Liver Function  08/10/2016  [facility]  Total Protein  6.2 g/
dL  Low  6.4-8.2  



 Tests    (315)-   -          









 Albumin  2.8 g/dL  Low  3.4-5.0  

 

 Globulin  3.4 g/dL    1.9-4.3  

 

 Alb/Glob  0.8 ratio      

 

 Bilirubin,Total  0.9 mg/dL    0.2-1.0  

 

 Bilirubin,Direct  0.5 mg/dL  High  0.0-0.2  

 

 Bilirubin,Indirect  0.4 mg/dL    0.0-0.9  

 

 Sgot/Ast  133 U/L  High  15-37  

 

 SGPT/Alt  226 U/L  High  12-78  

 

 Alkaline Phosphatase  95 U/L      









 Basic Metabolic Panel  08/10/2016  [facility]  Glucose  99 mg
/dL      



     (315)-   -          









 BUN  7 mg/dL    7-18  

 

 Creatinine  0.8 mg/dL    0.6-1.3  

 

 Glom Filtration Rate, Estimate  >60 mL/min    >60  

 

 If African American  >60 mL/min    >60  26

 

 BUN/Creat  8.7 ratio      

 

 Sodium  141 mmol/L    136-145  

 

 Potassium  3.6 mmol/L    3.5-5.1  

 

 Chloride  109 mmol/L  High    

 

 Carbon Dioxide  24 mmol/L    21-32  

 

 Anion Gap  8 mEq/L    8-16  

 

 Calcium  8.3 mg/dL  Low  8.5-10.1  









 Comprehensive Metabolic  2016  [facility]  Glucose  
109 mg/dL  High    



 Panel    (315)-   -          









 BUN  8 mg/dL    7-18  

 

 Creatinine  0.8 mg/dL    0.6-1.3  

 

 Glom Filtration Rate, Estimate  >60 mL/min    >60  

 

 If African American  >60 mL/min    >60  27

 

 BUN/Creat  10.0 ratio      

 

 Sodium  139 mmol/L    136-145  

 

 Potassium  4.0 mmol/L    3.5-5.1  

 

 Chloride  108 mmol/L  High    

 

 Carbon Dioxide  23 mmol/L    21-32  

 

 Anion Gap  8 mEq/L    8-16  

 

 Calcium  8.2 mg/dL  Low  8.5-10.1  

 

 Total Protein  6.3 g/dL  Low  6.4-8.2  

 

 Albumin  2.9 g/dL  Low  3.4-5.0  

 

 Globulin  3.4 g/dL    1.9-4.3  

 

 Alb/Glob  0.9 ratio      

 

 Bilirubin,Total  3.2 mg/dL  High  0.2-1.0  

 

 Sgot/Ast  183 U/L  High  15-37  

 

 SGPT/Alt  207 U/L  High  12-78  

 

 Alkaline Phosphatase  96 U/L      









 CBC  2016  [facility]  White Blood Count  6.3 K/uL    
3.1-10.7  



     (315)-   -          









 Red Blood Count  4.21 M/uL    3.90-5.40  

 

 Hemoglobin  11.8 gm/dL    11.6-15.8  

 

 Hematocrit  36.4 %    36.0-46.1  

 

 Mean Cell Volume  86.5 fl    80.9-99.0  

 

 Mean Corpuscular HGB  28.0 pg    25.9-32.7  

 

 Mean Corpuscular HGB Conc  32.4 g/dL    30.8-34.3  

 

 Platelet Count  293 K/uL    155-360  

 

 Red Cell Distri Width %CV  13.9 %    11.7-14.4  

 

 Mean Platelet Volume  9.1 fL    8.9-12.4  









 Urine Screen  2016  [facility]  Ua RFX Micro +  See 
Note      28



     (315)-   -  Culture II        

 

 Urinalysis With  2016  [facility]  Urine Color  YELLOW
    Yellow  



 Microscopic    (315)-   -          









 Urine Clarity  CLEAR    Clear  

 

 Urine Glucose - Dipstick  NEGATIVE mg/dL    Negative  

 

 Urine Bilirubin - Dipstick  MODERATE  High  Negative  

 

 Urine Ketone  NEGATIVE mg/dL    Negative  

 

 Urine Specific Gravity  1.020    1.010-1.030  

 

 Urine Blood  NEGATIVE    Negative  

 

 Urine PH  7.0    6.5-7.5  

 

 Urine Protein - Dipstick  NEGATIVE mg/dL    Negative  

 

 Urine Urobilinogen - Dipstick  1.0 E.U./dL    0.2-1.0  

 

 Urine Nitrite - Dipstick  NEGATIVE    Negative  

 

 Urine Leuk Esterase  TRACE  High  Negative  

 

 Urine RBC  NONE SEEN rbc/hpf    0-2  

 

 Urine WBC  2-5 wbc/hpf    0-7  

 

 Urine Epithelial Cells  FEW NONESEEN/lpf      

 

 Urine Bacteria  FEW NONESEEN      

 

 Urine Amorph Sediment  MODERATE    Negative  









 Comprehensive Metabolic  2016  [facility]  Glucose  
105 mg/dL      



 Panel    (315)-   -          









 BUN  11 mg/dL    7-18  

 

 Creatinine  0.9 mg/dL    0.6-1.3  

 

 Glom Filtration Rate, Estimate  >60 mL/min    >60  

 

 If African American  >60 mL/min    >60  29

 

 BUN/Creat  12.2 ratio      

 

 Sodium  139 mmol/L    136-145  

 

 Potassium  3.7 mmol/L    3.5-5.1  

 

 Chloride  105 mmol/L      

 

 Carbon Dioxide  25 mmol/L    21-32  

 

 Anion Gap  9 mEq/L    8-16  

 

 Calcium  9.4 mg/dL    8.5-10.1  

 

 Total Protein  7.3 g/dL    6.4-8.2  

 

 Albumin  3.2 g/dL  Low  3.4-5.0  

 

 Globulin  4.1 g/dL    1.9-4.3  

 

 Alb/Glob  0.8 ratio      

 

 Bilirubin,Total  2.9 mg/dL  High  0.2-1.0  

 

 Sgot/Ast  138 U/L  High  15-37  

 

 SGPT/Alt  149 U/L  High  12-78  

 

 Alkaline Phosphatase  95 U/L      









 Laboratory test  2016  [facility]  Lipase  1174 U/L  
High    30



 finding    (315)-   -          









 HCG,Serum (Qualitative)  NEGATIVE    (Negative)  31









 CBC W/Automated Diff  2016  [facility]  White Blood  
7.9 K/uL    3.1-10.7  



     (315)-   -  Count        









 Red Blood Count  4.66 M/uL    3.90-5.40  

 

 Hemoglobin  12.9 gm/dL    11.6-15.8  

 

 Hematocrit  39.3 %    36.0-46.1  

 

 Mean Cell Volume  84.3 fl    80.9-99.0  

 

 Mean Corpuscular HGB  27.7 pg    25.9-32.7  

 

 Mean Corpuscular HGB Conc  32.8 g/dL    30.8-34.3  

 

 Platelet Count  313 K/uL    155-360  

 

 Red Cell Distri Width SD  41.1 fl    3-47  

 

 Red Cell Distri Width %CV  13.7 %    11.7-14.4  

 

 Mean Platelet Volume  9.0 fL    8.9-12.4  

 

 Neut%  62.6 %    40.4-72.8  

 

 Lymph %  30.5 %    17.0-46.1  

 

 Mono %  6.0 %    4.3-13.2  

 

 Eo%  0.6 %    0.0-6.6  

 

 Bas%  0.3 %    0.0-1.1  

 

 Neut#  4.92 K/uL    1.8-7.0  

 

 Lymph #  2.40 K/uL    1.8-7.0  

 

 Mono #  0.47 K/uL    0.3-0.9  

 

 Eos #  0.05 K/uL    0.0-0.5  

 

 Baso #  0.02 K/uL    0.0-0.1  









 Urine HCG  2016  [facility]  Urine HCG  NEGATIVE    
Negative  32



 (Qualitative)    (315)-   -  (Qualitative)        

 

 Glycohemoglobin A1c  2016  [facility]  Glycohemoglobin
  5.7 %    4.2-6.3  33



     (315)-   -  (A1c)        









 eAG  117 mg/dL      









 Laboratory test  2016  [facility]  Thyroid Stim  0.63 
uIU/mL    0.30-4.20  



 finding    (315)-   -  Hormone        

 

 CMP, Comp  2016  [facility]  Glucose  92 mg/dL    74-
106  



 Metabolic Panel    (315)-   -          









 BUN  11 mg/dL    7-18  

 

 Creatinine  0.8 mg/dL    0.6-1.3  

 

 Glom Filtration Rate, Estimate  >60 mL/min    >60  

 

 If African American  >60 mL/min    >60  34

 

 BUN/Creat  13.7 ratio      

 

 Sodium  140 mmol/L    136-145  

 

 Potassium  4.2 mmol/L    3.5-5.1  

 

 Chloride  105 mmol/L      

 

 Carbon Dioxide  26 mmol/L    21-32  

 

 Anion Gap  9 mEq/L    8-16  

 

 Calcium  8.7 mg/dL    8.5-10.1  

 

 Total Protein  7.1 g/dL    6.4-8.2  

 

 Albumin  3.1 g/dL  Low  3.4-5.0  

 

 Globulin  4.0 g/dL    1.9-4.3  

 

 Alb/Glob  0.8 ratio      

 

 Bilirubin,Total  0.5 mg/dL    0.2-1.0  

 

 Sgot/Ast  12 U/L  Low  15-37  35

 

 SGPT/Alt  25 U/L    12-78  

 

 Alkaline Phosphatase  60 U/L      









 CBC  2016  [facility]  White Blood Count  8.8 K/uL    
3.1-10.7  



     (315)-   -          









 Red Blood Count  4.53 M/uL    3.90-5.40  

 

 Hemoglobin  12.2 gm/dL    11.6-15.8  

 

 Hematocrit  38.6 %    36.0-46.1  

 

 Mean Cell Volume  85.2 fl    80.9-99.0  

 

 Mean Corpuscular HGB  26.9 pg    25.9-32.7  

 

 Mean Corpuscular HGB Conc  31.6 g/dL    30.8-34.3  

 

 Platelet Count  342 K/uL    155-360  

 

 Red Cell Distri Width %CV  13.5 %    11.7-14.4  

 

 Mean Platelet Volume  9.2 fL    8.9-12.4  









 BMP (Basic)  2016  [facility]  Glucose  93 mg/dL    74-
106  



     (315)-   -          









 BUN  14 mg/dL    7-18  

 

 Creatinine  0.6 mg/dL    0.6-1.3  

 

 Glom Filtration Rate, Estimate  >60 mL/min    >60  

 

 If African American  >60 mL/min    >60  36

 

 BUN/Creat  23.3 ratio      

 

 Sodium  140 mmol/L    136-145  

 

 Potassium  4.3 mmol/L    3.5-5.1  

 

 Chloride  105 mmol/L      

 

 Carbon Dioxide  27 mmol/L    21-32  

 

 Anion Gap  8 mEq/L    8-16  

 

 Calcium  8.8 mg/dL    8.5-10.1  









 Glycohemoglobin A1c  2016  [facility]  Glycohemoglobin
  5.8 %    4.2-6.3  37



     (315)-   -  (A1c)        









 eAG  120 mg/dL      









 Laboratory test  2016  [facility]  Thyroid Stim  1.13 
uIU/mL    0.36-3.74  



 finding    (315)-   -  Hormone        

 

 Basic Metabolic  2015  [facility]  Glucose  96 mg/dL  
    



 Panel    (315)-   -          









 BUN  11 mg/dL    7-18  

 

 Creatinine  0.7 mg/dL    0.6-1.3  

 

 Glom Filtration Rate, Estimate  >60 mL/min    >60  

 

 If African American  >60 mL/min    >60  38

 

 BUN/Creat  15.7 ratio      

 

 Sodium  139 mmol/L    136-145  

 

 Potassium  4.4 mmol/L    3.5-5.1  

 

 Chloride  107 mmol/L      

 

 Carbon Dioxide  23 mmol/L    21-32  

 

 Anion Gap  9 mEq/L    8-16  

 

 Calcium  8.8 mg/dL    8.5-10.1  









 Laboratory test  2015  [facility]  Thyroid Stim  1.97 
   0.36-3.74  



 finding    (315)-   -  Hormone  uIU/mL      

 

 Glycohemoglobin A1c  2015  [facility]  Glycohemoglobin
  5.6 %    4.2-6.3  39



     (315)-   -  (A1c)        









 eAG  114 mg/dL      









 CBC  2015  [facility]  White Blood Count  6.4 K/uL    
3.1-10.7  



     (315)-   -          









 Red Blood Count  4.11 M/uL    3.90-5.40  

 

 Hemoglobin  11.2 gm/dL  Low  11.6-15.8  

 

 Hematocrit  35.2 %  Low  36.0-46.1  

 

 Mean Cell Volume  85.6 fl    80.9-99.0  

 

 Mean Corpuscular HGB  27.3 pg    25.9-32.7  

 

 Mean Corpuscular HGB Conc  31.8 g/dL    30.8-34.3  

 

 Platelet Count  290 K/uL    155-360  

 

 Red Cell Distri Width %CV  14.2 %    11.7-14.4  

 

 Mean Platelet Volume  9.4 fL    8.9-12.4  









 Laboratory test finding  2014  N2N/CCD Import  % A1c  5.5 %    4.1-6.5  









 % Baso.  0.9 %    0.0-2.0  

 

 % Eos.  1.3 %    0.0-4.0  

 

 % Lymph  36 %    20-44  

 

 % Mono  6.6 %    2.0-10.0  

 

 % Radha  56 %    50-70  

 

 Absolute Baso.  0.1 K/ul    0.0-0.3  

 

 Absolute Eos.  0.1 K/ul    0.0-0.5  

 

 Absolute Lymph.  2.9 K/ul    0.8-4.8  

 

 Absolute Mono.  0.5 K/ul    0.1-1.0  

 

 Absolute Radha.  4.50 K/ul    2.05-7.63  

 

 BUN  17.0 mg/dL    7.0-18.0  

 

 BUN/Creat Ratio  21.3 ratio  High  12.0-20.0  

 

 Calcium  9.9 mg/dL    8.7-10.5  

 

 Chloride  107.0 mmol/L    98.0-107.0  

 

 Co2  25.0 mmol/L    22.0-30.0  

 

 Creatinine-Serum  0.8 mg/dL    0.7-1.2  

 

 Glucose  95.0 mg/dL    75.0-110.0  

 

 HCT  38.5 %    37.0-51.0  

 

 HGB  12.7 Gm/dl    12.0-16.0  

 

 MCH  28.1 pg    26.0-32.0  

 

 MCHC  33.0 g/dL    31.0-36.0  

 

 MCV  85.0 Fl    80.0-97.0  

 

 MPV  5.8 fL  Low  6.0-10.0  

 

 PLT  378 K/ul    140-440  

 

 Potasium  4.5 mmol/L    3.6-5.0  

 

 RBC  4.5 M/ul    4.2-6.3  

 

 RDW  13.0 %    11.5-14.5  

 

 Sodium  140.0 mmil/L    137.0-145.0  

 

 TSH  1.31 uIU/ml    0.50-6.00  

 

 Vitamin B12  409.0 pg/mL    200.0-900.0  

 

 Vitamin D  30.0 ng/mL    30.0-100.0  

 

 WBC  8.1 K/ul    4.1-10.9  

 

 eGFR  98.2      









 Laboratory test  2014  N2N/CCD Import  Throat Culture  See Note      40



 finding      Complete        

 

 Laboratory test  2013  N2N/CCD Import  % A1c  5.7 %    4.1-6.5  



 finding              









 % Baso.  0.9 %    0.0-2.0  

 

 % Eos.  1.8 %    0.0-4.0  

 

 % Lymph  43 %    20-44  

 

 % Mono  5.9 %    2.0-10.0  

 

 % Radha  49 %  Low  50-70  

 

 Absolute Baso.  0.1 K/ul    0.0-0.3  

 

 Absolute Eos.  0.2 K/ul    0.0-0.5  

 

 Absolute Lymph.  3.6 K/ul    0.8-4.8  

 

 Absolute Mono.  0.5 K/ul    0.1-1.0  

 

 Absolute Radha.  4.09 K/ul    2.05-7.63  

 

 BUN  13.0 mg/dL    7.0-18.0  

 

 BUN/Creat Ratio  16.3 ratio    12.0-20.0  

 

 Calcium  10.0 mg/dL    8.7-10.5  

 

 Chloride  106.0 mmol/L    98.0-107.0  

 

 Co2  24.0 mmol/L    22.0-30.0  

 

 Creatinine-Serum  0.8 mg/dL    0.7-1.2  

 

 Glucose  100.0 mg/dL    75.0-110.0  

 

 HCT  38.3 %    37.0-51.0  

 

 HGB  11.9 Gm/dl  Low  12.0-16.0  

 

 MCH  27.1 pg    26.0-32.0  

 

 MCHC  31.0 g/dL    31.0-36.0  

 

 MCV  87.2 Fl    80.0-97.0  

 

 MPV  5.9 fL  Low  6.0-10.0  

 

 PLT  404 K/ul    140-440  

 

 Potasium  4.0 mmol/L    3.6-5.0  

 

 RBC  4.4 M/ul    4.2-6.3  

 

 RDW  12.6 %    11.5-14.5  

 

 Sodium  140.0 mmil/L    137.0-145.0  

 

 TSH  1.58 uIU/ml    0.50-6.00  

 

 WBC  8.4 K/ul    4.1-10.9  

 

 eGFR  99.3      









 Hepatic Function Panel LFT  2013  N2N/CCD Import  Albumin  4.4 g/dL    
3.5-5.0  









 Alk. Phos.  76.0 U/L    30.0-126.0  

 

 Alt  21.0 U/L    9.0-52.0  

 

 Ast  18.0 U/L    14.0-36.0  

 

 Total Bilirubin  0.3 mg/dL    0.2-1.3  

 

 Total Protein  7.0 g/dL    6.3-8.2  









 Laboratory test finding  2013  N2N/CCD Import  Anion Gap  14 mEq/L    8-
16  









 BUN  15 mg/dL    5-23  

 

 BUN/Creat  16.6 ratio      

 

 Calcium  8.8 mg/dL    8.5-10.1  

 

 Carbon Dioxide  23 mEq/L    18-29  

 

 Chloride  106 mmol/L      

 

 Creatinine  0.9 mg/dL    0.5-1.4  

 

 Glom Filtration Rate, Estimate  >60 mL/min    >60  

 

 Glucose  89 mg/dL      

 

 Glycohemoglobin (A1c)  6.0 %    4.8-6.0  41

 

 If   >60 mL/min    >60  42

 

 Potassium  4.0 mmol/L    3.5-5.1  

 

 Sodium  139 mmol/L    136-145  

 

 eAG  126 mg/dL      









 Laboratory test finding  2013  N2N/CCD Import  Anion Gap  10 mEq/L    8-
16  









 BUN  14 mg/dL    5-23  

 

 BUN/Creat  17.5 ratio      

 

 Calcium  9.0 mg/dL    8.5-10.1  

 

 Carbon Dioxide  26 mEq/L    18-29  

 

 Chloride  104 mmol/L      

 

 Creatinine  0.8 mg/dL    0.5-1.4  

 

 Glom Filtration Rate, Estimate  >60 mL/min    >60  

 

 Glucose  107 mg/dL      

 

 Glycohemoglobin (A1c)  5.9 %    4.8-6.0  43

 

 If   >60 mL/min    >60  44

 

 Potassium  4.0 mmol/L    3.5-5.1  

 

 Sodium  136 mmol/L    136-145  

 

 eAG  123 mg/dL      









 Laboratory test finding  2012  N2N/CCD Import  Alb/Glob  1.2 ratio      









 Albumin  4.0 g/dL    3.5-5.0  

 

 Alkaline Phosphatase  77 U/L      

 

 Anion Gap  14 mEq/L    8-16  

 

 Anti Strep O Screen  Negative Iu/ml    Negative  

 

 BUN  11 mg/dL    5-23  

 

 BUN/Creat  15.7 ratio      

 

 Bas%  0.2 %    0.0-1.1  

 

 Baso #  0.02 K/uL    0.0-0.1  

 

 Bilirubin,Total  0.4 mg/dL    0.2-1.2  

 

 Calcium  9.5 mg/dL    8.5-10.1  

 

 Carbon Dioxide  27 mEq/L    18-29  

 

 Chloride  103 mmol/L      

 

 Creatinine  0.7 mg/dL    0.5-1.4  

 

 Ebv AB Vca,Igg  <0.2     0.0-0.8  45

 

 Ebv AB Vca,Igm  <0.2     0.0-0.8  46

 

 Ebv Early Antigen AB, IgG  <0.2     0.0-0.8  47

 

 Ebv Interpretation  See Note      48

 

 Ebv Nuclear Antigen AB, Igg  <0.2     0.0-0.8  49

 

 Eo%  0.9 %    0.0-6.6  

 

 Eos #  0.09 K/uL    0.0-0.5  

 

 Globulin  3.4 g/dL    1.9-4.3  

 

 Glom Filtration Rate, Estimate  >60 mL/min      

 

 Glucose  121 mg/dL  High    

 

 Hematocrit  40.1 %    36.0-46.0  

 

 Hemoglobin  13.4 gm/dL    12.0-16.0  

 

 If   >60 mL/min      

 

 Lymph #  2.98 K/uL    0.8-3.4  

 

 Lymph %  29.5 %    17.0-46.1  

 

 Mean Cell Volume  82.0 fl    77.0-95.0  

 

 Mean Corpuscular HGB  27.4 pg    25.0-30.0  

 

 Mean Corpuscular HGB Conc  33.4 g/dL    30.8-34.3  

 

 Mean Platelet Volume  9.6 fL    8.9-12.4  

 

 Mono #  0.54 K/uL    0.0-0.6  

 

 Mono %  5.4 %    4.3-13.2  

 

 Monoscreen (Heterophile)  Negative    Negative  50

 

 Neut#  6.46 K/uL    1.0-7.0  

 

 Neut%  64.0 %    28.0-68.0  

 

 Platelet Count  411 K/uL  High  155-360  

 

 Potassium  3.9 mmol/L    3.5-5.1  

 

 Red Blood Count  4.89 M/uL    4.10-5.10  

 

 Red Cell Distri Width %CV  13.8 %    11.7-14.4  

 

 Red Cell Distri Width SD  40.1 fl    3-47  

 

 SGPT/Alt  29 U/L  Low  30-65  

 

 Sedimentation Rate  14 mm/hr    0-20  

 

 Sgot/Ast  14 U/L  Low  16-40  

 

 Sodium  140 mmol/L    136-145  

 

 Thyroid Stim Hormone  1.08 uIU/mL    0.49-4.67  

 

 Total Protein  7.4 g/dL    6.3-8.0  

 

 White Blood Count  10.1 K/uL    4.5-13.5  









 Laboratory test finding  2012  N2N/CCD Import  Anion Gap  13 mEq/L    8-
16  









 BUN  13 mg/dL    5-23  

 

 BUN/Creat  18.5 ratio      

 

 Calcium  9.2 mg/dL    8.5-10.1  

 

 Carbon Dioxide  25 mEq/L    18-29  

 

 Chloride  105 mmol/L      

 

 Creatinine  0.7 mg/dL    0.5-1.4  

 

 Glom Filtration Rate, Estimate  >60 mL/min      

 

 Glucose  106 mg/dL      

 

 Glycohemoglobin (A1c)  6.1 %  High  4.8-6.0  51

 

 If   >60 mL/min      

 

 Potassium  4.2 mmol/L    3.5-5.1  

 

 Sodium  139 mmol/L    136-145  

 

 eAG  128 mg/dL      









 Laboratory test finding  2012  N2N/CCD Import  Anion Gap  12 mEq/L    8-
16  









 BUN  13 mg/dL    5-23  

 

 BUN/Creat  16.2 ratio      

 

 Calcium  9.2 mg/dL    8.5-10.1  

 

 Carbon Dioxide  25 mEq/L    18-29  

 

 Chloride  107 mmol/L      

 

 Creatinine  0.8 mg/dL    0.5-1.4  

 

 Glom Filtration Rate, Estimate  >60 mL/min      

 

 Glucose  96 mg/dL      

 

 Glycohemoglobin (A1c)  5.7 %    4.8-6.0  52

 

 If   >60 mL/min      

 

 Potassium  4.2 mmol/L    3.5-5.1  

 

 Sodium  140 mmol/L    136-145  

 

 eAG  117 mg/dL      









 LDL Cholesterol Profile  2011  N2N/CCD Import  Cholesterol  159 mg/dL    
120-200  









 HDL Cholesterol  28 mg/dL  Low  29-83  

 

 LDL-Cholesterol  101 mg/dL      

 

 Triglycerides  150 mg/dL      









 Laboratory test finding  2011  N2N/CCD Import  Anion Gap  11 mEq/L    8-
16  









 BUN  11 mg/dL    5-23  

 

 BUN/Creat  12.2 ratio      

 

 Calcium  9.7 mg/dL    8.5-10.1  

 

 Carbon Dioxide  25 mEq/L    18-29  

 

 Chloride  107 mmol/L      

 

 Cortisol,Am  11.47 g/dL    4.30-22.40  

 

 Creatinine  0.9 mg/dL    0.5-1.4  

 

 Glom Filtration Rate, Estimate  >60 mL/min      

 

 Glucose  89 mg/dL      

 

 Glycohemoglobin (A1c)  5.7 %    4.8-6.0  53

 

 If   >60 mL/min      

 

 Potassium  4.4 mmol/L    3.5-5.1  

 

 SGPT/Alt  22 U/L  Low  30-65  

 

 Sgot/Ast  13 U/L  Low  16-40  

 

 Sodium  139 mmol/L    136-145  

 

 Thyroid Stim Hormone  1.34 uIU/mL    0.49-4.67  

 

 eAG  117 mg/dL      









 Laboratory test finding  2011  N2N/CCD Import  Anion Gap  19 mmol/L    10
-20  54









 BUN  12 mg/dL    7-18  

 

 BUN/CR Ratio  17.5 Ratio    12-20  

 

 Calcium  9.7 mg/dL    8.7-10.5  

 

 Carbon Dioxide  26 mmol/L    22-30  

 

 Chloride  104 mmol/L      

 

 Creatinine, Serum  0.7 mg/dL    0.7-1.2  

 

 Glucose  96 mg/dL      

 

 Hemoglobin A1c  5.8 %    4.1-6.5  

 

 Potassium  4.7 mmol/L    3.6-5.0  

 

 Sodium  143 mmol/L    137-145  









 Laboratory test finding  2011  N2N/CCD Import  Anion Gap  10 mEq/L    8-
16  









 BUN  13 mg/dL    5-23  

 

 BUN/Creat  16.2      

 

 Calcium  9.0 mg/dL    8.5-10.1  

 

 Carbon Dioxide  28 mEq/L    21-32  

 

 Chloride  103 mEq/L      

 

 Creatinine  0.8 mg/dL    0.5-1.4  

 

 Glom Filtration Rate, Estimate  >60 mL/min      

 

 Glucose  81 mg/dL      

 

 Glycohemoglobin (A1c)  5.6 %    4.8-6.0  55

 

 If   >60 mL/min      

 

 Potassium  4.3 mEq/L    3.5-5.1  

 

 Sodium  137 mEq/L    136-145  

 

 eAG  114 mg/dL      









 Laboratory test finding  2010  N2N/CCD Import  Anion Gap  13 mEq/L    8-
16  









 BUN  16 mg/dL    5-23  

 

 BUN/Creat  20.0      

 

 Calcium  8.7 mg/dL    8.5-10.1  

 

 Carbon Dioxide  24 mEq/L    21-32  

 

 Chloride  106 mEq/L      

 

 Creatinine  0.8 mg/dL    0.5-1.4  

 

 FSH  3.6 mIU/mL      56

 

 Glom Filtration Rate, Estimate  >60 mL/min      

 

 Glucose  95 mg/dL      

 

 Glycohemoglobin A1c  5.6 %    4.8-6.0  57

 

 If   >60 mL/min      

 

 Insulin  41.9 uIU/mL  High  0.0-24.9  58

 

 Luteinizing Hormone  3.1 mIU/mL      59

 

 Potassium  4.0 mEq/L    3.5-5.1  

 

 Sodium  139 mEq/L    136-145  









 Laboratory test finding  05/15/2010  N2N/CCD Import  Alb/Glob  1.0      









 Albumin  3.7 g/dL    3.5-5.0  

 

 Alkaline Phosphatase  126 U/L      

 

 Anion Gap  11 mEq/L    8-16  

 

 BUN  12 mg/dL    5-23  

 

 BUN/Creat  17.1      

 

 Bas%  0.3 %    0.0-1.1  

 

 Baso #  0.0 K/uL    0.0-0.1  

 

 Bilirubin,Total  0.6 mg/dL    0.2-1.2  

 

 Calcium  9.1 mg/dL    8.5-10.1  

 

 Carbon Dioxide  26 mEq/L    21-32  

 

 Chloride  105 mEq/L      

 

 Cortisol,Random  8.7 [email or web address]/      

 

 Creatinine  0.7 mg/dL    0.5-1.4  

 

 Eo%  1.1 %    0.0-6.6  

 

 Eos #  0.1 K/uL    0.0-0.5  

 

 Globulin  3.8 gm/dL    1.9-4.3  

 

 Glom Filtration Rate, Estimate  >60 mL/min      

 

 Glucose  88 mg/dL      

 

 Hematocrit  38.1 %    36.0-46.0  

 

 Hemoglobin  12.4 gm/dL    12.0-16.0  

 

 If   >60 mL/min      

 

 Lymph #  2.8 K/uL    0.8-3.4  

 

 Lymph %  43.5 %    17.0-46.1  

 

 Mean Cell Volume  83.2 fl    77.0-95.0  

 

 Mean Corpuscular HGB  27.1 pg    25.0-30.0  

 

 Mean Corpuscular HGB Conc  32.5 g/dL    30.8-34.3  

 

 Mean Platelet Volume  9.0 fL    8.9-12.4  

 

 Mono #  0.4 K/uL    0.0-0.6  

 

 Mono %  6.1 %    4.3-13.2  

 

 Neut#  3.1 K/uL    1.0-7.0  

 

 Neut%  49.0 %    28.0-68.0  

 

 Platelet Count  372 K/uL  High  155-360  

 

 Potassium  4.5 mEq/L    3.5-5.1  

 

 Red Blood Count  4.58 M/uL    4.10-5.10  

 

 Red Cell Distri Width %CV  13.1 %    11.7-14.4  

 

 Red Cell Distri Width SD  39 fl    3-47  

 

 SGPT/Alt  33 U/L    30-65  

 

 Sgot/Ast  17 U/L    16-40  

 

 Sodium  137 mEq/L    136-145  

 

 Thyroid Stim Hormone  1.49 uIU/mL    0.49-4.67  

 

 Total Protein  7.5 g/dL    6.3-8.0  

 

 White Blood Count  6.4 K/uL    4.5-13.5  









 LDL Cholesterol Profile  05/15/2010  N2N/CCD Import  Cholesterol  154 mg/dL    
120-200  









 HDL Cholesterol  35 mg/dL    32-96  

 

 LDL-Cholesterol  95 mg/dL      

 

 Triglycerides  122 mg/dL    0-210  









 Laboratory test  03/10/2009  N2N/CCD Import  Culture Throat  Normal Throat    
  60



 finding        FL <See Note>      

 

 Laboratory test  2008  N2N/CCD Import  Anion Gap  14 mEq/L    8-16  



 finding              









 BUN  13 mg/dL    5-23  

 

 BUN/Creat  21.6      

 

 Bas%  0.4 %    0.0-1.1  

 

 Baso #  0.0 K/uL    0.0-0.1  

 

 Calcium  9.8 mg/dL    8.5-10.1  

 

 Carbon Dioxide  26 mEq/L    21-32  

 

 Chloride  102 mEq/L      

 

 Creatinine  0.6 mg/dL    0.5-1.4  

 

 Eo%  1.1 %    0.0-6.6  

 

 Eos #  0.1 K/uL    0.0-0.5  

 

 Glucose  106 mg/dL      

 

 Hematocrit  39.6 %    36.0-46.0  

 

 Hemoglobin  13.1 gm/dL    12.0-16.0  

 

 Lymph #  3.2 K/uL    0.8-3.4  

 

 Lymph %  37.9 %    17.0-46.1  

 

 Mean Cell Volume  82.3 fl    77.0-95.0  

 

 Mean Corpuscular HGB  27.2 pg    25.0-30.0  

 

 Mean Corpuscular HGB Conc  33.1 g/dL    30.8-34.3  

 

 Mean Platelet Volume  8.9 fL    8.9-12.4  

 

 [place] #  0.7 K/uL  High  0.0-0.6  

 

 Mono %  7.7 %    4.3-13.2  

 

 Neut#  4.5 K/uL    1.0-7.0  

 

 Neut%  52.9 %    28.0-68.0  

 

 Platelet Count  372 K/uL  High  155-360  

 

 Potassium  4.0 mEq/L    3.5-5.1  

 

 Red Blood Count  4.81 M/uL    4.10-5.10  

 

 Red Cell Distri Width %CV  13.2 %    11.7-14.4  

 

 Red Cell Distri Width SD  39 fl    3-47  

 

 Sodium  138 mEq/L    136-145  

 

 Thyroid Stim Hormone  1.61 uIU/mL    0.49-4.67  

 

 White Blood Count  8.6 K/uL    4.5-13.5  









 Laboratory test finding  2007  N2N/CCD Import  A/G Ratio  1.6    1.0-2.2
  









 Albumin  4.4 g/dL    3.5-5.0  

 

 Alkaline Phosphatase  269 U/L  High    

 

 Alt  37 U/L    9-52  

 

 Ast  27 U/L    14-36  

 

 BUN  14 mg/dL    7-18  

 

 BUN/CR Ratio  19.5 Ratio    12-20  

 

 Calcium  10.0 mg/dL    8.7-10.5  

 

 Carbon Dioxide  24 mmol/L    22-30  

 

 Chloride  105 mmol/L      

 

 Creatinine, Serum  0.7 mg/dL    0.7-1.2  

 

 Globulin  2.8 g/dL    2.7-4.3  

 

 Glucose  84 mg/dL      

 

 Potassium  4.3 mmol/L    3.6-5.0  

 

 Sodium  140 mmol/L    137-145  

 

 TSH  1.617 uIU/ml    0.50-6.00  

 

 Total Bilirubin  0.1 mg/dL  Low  0.2-1.3  

 

 Total Protein  7.2 g/dL    6.3-8.2  

 

 Bas%  0.5 %    0.1-1.0  

 

 Baso #  0.1 K/uL    0.1-0.2  

 

 Cortisol,Random  14.5 g/dL    2.4-22.9  61

 

 Eo%  0.8 %    0.0-5.0  

 

 Eos #  0.1 K/uL    0.0-0.5  

 

 Hematocrit  38.4 %    36.0-46.0  

 

 Hemoglobin  13.5 gm/dL    12.0-16.0  

 

 Vu#  0.2    0.0-1.5  

 

 Vu%  1.7 %    0.0-4.0  

 

 Lymph #  4.0 K/uL    1.2-4.0  

 

 Lymph %  36.6 %    17.0-56.0  

 

 Mean Cell Volume  83.0 fL    77.0-95.0  

 

 Mean Corpuscular HGB  29.2 pg    25.0-30.0  

 

 Mean Corpuscular HGB Conc  35.1 g/dL    31.7-36.0  

 

 Mean Platelet Volume  6.8 fl    6.6-10.6  

 

 Mono #  0.5 K/uL    0.0-0.6  

 

 Mono %  4.9 %    0.0-10.0  

 

 Neut#  6.0 K/uL    1.8-7.0  

 

 Neut%  55.5 %    28.0-68.0  

 

 Platelet Count  360 K/uL    150-400  

 

 Red Blood Count  4.63 M/uL    4.10-5.10  

 

 Red Cell Distri Width %CV  12.8 %    11.6-15.8  

 

 White Blood Count  10.8 K/uL    4.5-13.5  









 Laboratory test finding  02/10/2006  N2N/CCD Import  Anion Gap  10 mEq/L    8-
16  









 BUN  18 mg/dL    5-23  

 

 BUN/Creat  30.0      

 

 Calcium  9.3 mg/dL    8.5-10.1  

 

 Carbon Dioxide  28 mEq/L    21-32  

 

 Chloride  107 mEq/L      

 

 Creatinine  0.6 mg/dL    0.5-1.4  

 

 Glucose  79 mg/dL      

 

 Hematocrit  39.4 %    35.0-45.0  

 

 Hemoglobin  13.8 gm/dL    11.5-15.5  

 

 Mean Cell Volume  82.5 fL    77.0-95.0  

 

 Mean Corpuscular HGB  28.9 pg    25.0-33.0  

 

 Mean Corpuscular HGB Conc  35.0 g/dL    31.7-36.0  

 

 Mean Platelet Volume  7.4 fl    6.6-10.6  

 

 Platelet Count  411 K/uL  High  150-400  

 

 Potassium  4.3 mEq/L    3.5-5.1  

 

 Red Blood Count  4.77 M/uL    4.00-5.20  

 

 Red Cell Distri Width %CV  13.3 %    11.6-15.8  

 

 Sodium  141 mEq/L    136-145  

 

 Thyroid Stim Hormone  0.94 uIU/mL    0.49-4.67  

 

 White Blood Count  8.3 K/uL    4.5-13.5  









 1  R73.01  I10

 

 2  Elevated levels of HbA1c suggest the need for more



   aggressive treatment of glycemia.



   



   The American Diabetes Association recommends that a primary



   goal of therapy should be a HbA1c of <7% and that physicians



   should re-evaluate the treatment regimen in patients with



   HbA1c values consistently >8%.

 

 3  Note:



   Persistent reduction for 3 months or more in an eGFR <60



   mL/min/1.73 m2 defines CKD.  Patients with eGFR values >/=60



   mL/min/1.73 m2 may also have CKD if evidence of persistent



   proteinuria is present.



   



   The original MDRD equation for estimated GFR is not valid



   for patients less than 18 years of age.



   



   Additional information may be found at www.kdoqi.org.

 

 4  Reference Guidelines*:



   Desirable: ........... < 200 mg/dL



   Borderline High: ..... 200-239 mg/dL



   High: ................ >=240 mg/dL



   



   * The National Cholesterol Education Program (NCEP)

 

 5  Reference Guidelines*:



   Normal: ............. < 150 mg/dL



   Borderline High: .... 150-199 mg/dL



   High: ............... 200-499 mg/dL



   Very High: .......... > 500 mg/dL



   * Source: National Cholesterol Education Program (NCEP)

 

 6  Reference Guidelines*:



   Low HDL: ..... < 40 mg/dL



   Normal:  ..... 40-60 mg/dL



   Desirable: ... > 60 mg/dL



   



   *The National Cholesterol Education Program(NCEP)

 

 7  Reference Guidelines*:



   Optimal:........... <100 mg/dL



   Near Optimal....... 100-129 mg/dL



   Borderline High.... 130-159 mg/dL



   High............... 160-189 mg/dL



   Very High.......... >=190 mg/dL



   * Source: National Cholesterol Education Program (NCEP)

 

 8  SPECIMEN DESCRIPTION        STOOL



   RESULT                      NEGATIVE FOR H. PYLORI ANTIGEN BY EIA



   REPORT STATUS               FINAL 2018



   Unless otherwise specified, testing performed by [facility]



   [facility] [employer]  [address] [place] [address]

 

 9  Updated reference range on new analyzer 3-2017

 

 10  Updated reference range on new analyzer 3-2017

 

 11  Concerning GFR Guidelines:



   Normal function or mild renal disease, if clinically at risk:  >/=60



   mL/min



   Moderately decreased:  30-59



   Severely decreased:  15-29



   Renal failure:  <15



   



   Glomerular Filtration Rate (GFR) is estimated based on the MDRD



   equation, which assumes a steady state for creatinine as recommended



   by the National Kidney Disease Education Program in conjunction with



   the National Institutes of Health and the National Kidney Foundation.



   



   Clinical conditions in which it may be necessary to measure GFR by



   using clearance methods include extremes of age and body size, severe



   malnutrition or obesity, diseases of skeletal muscle, paraplegia or



   quadriplegia, vegetarian diet, rapidly changing kidney function, and



   calculation of the dose of potentially toxic drugs that are excreted



   by the kidneys.

 

 12  Concerning GFR Guidelines for  Americans:



   Normal function or mild renal disease, if clinically at risk:  >/=60



   mL/min



   Moderately decreased:  30-59



   Severely decreased:  15-29



   Renal failure:  <15

 

 13  Updated reference range on new analyzer 3-2017

 

 14  I10,F32.0,R73.01

 

 15  Note:



   Persistent reduction for 3 months or more in an eGFR <60



   mL/min/1.73 m2 defines CKD.  Patients with eGFR values >/=60



   mL/min/1.73 m2 may also have CKD if evidence of persistent



   proteinuria is present.



   



   The original MDRD equation for estimated GFR is not valid



   for patients less than 18 years of age.



   



   Additional information may be found at www.kdoqi.org.

 

 16  Values below the stated reference ranges of AST and ALT can



   be seen in normal populations. Clinical correlation is



   suggested.

 

 17  Elevated levels of HbA1c suggest the need for more



   aggressive treatment of glycemia.



   



   The American Diabetes Association recommends that a primary



   goal of therapy should be a HbA1c of <7% and that physicians



   should re-evaluate the treatment regimen in patients with



   HbA1c values consistently >8%.

 

 18  R73.01

 

 19  Note:



   Persistent reduction for 3 months or more in an eGFR <60



   mL/min/1.73 m2 defines CKD.  Patients with eGFR values >/=60



   mL/min/1.73 m2 may also have CKD if evidence of persistent



   proteinuria is present.



   



   The original MDRD equation for estimated GFR is not valid



   for patients less than 18 years of age.



   



   Additional information may be found at www.kdoqi.org.

 

 20  Elevated levels of HbA1c suggest the need for more



   aggressive treatment of glycemia.



   



   The American Diabetes Association recommends that a primary



   goal of therapy should be a HbA1c of <7% and that physicians



   should re-evaluate the treatment regimen in patients with



   HbA1c values consistently >8%.

 

 21  3 mos

 

 22  Concerning GFR Guidelines:



   Normal function or mild renal disease, if clinically at risk:  >/=60



   mL/min



   Moderately decreased:  30-59



   Severely decreased:  15-29



   Renal failure:  <15



   



   Glomerular Filtration Rate (GFR) is estimated based on the MDRD



   equation, which assumes a steady state for creatinine as recommended



   by the National Kidney Disease Education Program in conjunction with



   the National Institutes of Health and the National Kidney Foundation.



   



   Clinical conditions in which it may be necessary to measure GFR by



   using clearance methods include extremes of age and body size, severe



   malnutrition or obesity, diseases of skeletal muscle, paraplegia or



   quadriplegia, vegetarian diet, rapidly changing kidney function, and



   calculation of the dose of potentially toxic drugs that are excreted



   by the kidneys.

 

 23  Concerning GFR Guidelines for  Americans:



   Normal function or mild renal disease, if clinically at risk:  >/=60



   mL/min



   Moderately decreased:  30-59



   Severely decreased:  15-29



   Renal failure:  <15

 

 24  Note:



   Persistent reduction for 3 months or more in an eGFR <60



   mL/min/1.73 m2 defines CKD.  Patients with eGFR values >/=60



   mL/min/1.73 m2 may also have CKD if evidence of persistent



   proteinuria is present.



   The original MDRD equation for estimated GFR is not valid



   for patients less than 18 years of age.



   Additional information may be found at www.kdoqi.org.

 

 25  GALLBLADDER



   Hard copy of report to be sent by mail



   Report may be viewed in Clinical Review,



   or in PCI under Medical Record Forms

 

 26  Note:



   Persistent reduction for 3 months or more in an eGFR <60



   mL/min/1.73 m2 defines CKD.  Patients with eGFR values >/=60



   mL/min/1.73 m2 may also have CKD if evidence of persistent



   proteinuria is present.



   The original MDRD equation for estimated GFR is not valid



   for patients less than 18 years of age.



   Additional information may be found at www.[email or web address].

 

 27  Note:



   Persistent reduction for 3 months or more in an eGFR <60



   mL/min/1.73 m2 defines CKD.  Patients with eGFR values >/=60



   mL/min/1.73 m2 may also have CKD if evidence of persistent



   proteinuria is present.



   The original MDRD equation for estimated GFR is not valid



   for patients less than 18 years of age.



   Additional information may be found at [email or web address].

 

 28  16 [email or web address]   Deleted by [facility]

 

 29  Note:



   Persistent reduction for 3 months or more in an eGFR <60



   mL/min/1.73 m2 defines CKD.  Patients with eGFR values >/=60



   mL/min/1.73 m2 may also have CKD if evidence of persistent



   proteinuria is present.



   The original MDRD equation for estimated GFR is not valid



   for patients less than 18 years of age.



   Additional information may be found at [email or web address].

 

 30  **CALLED LIPASE TO SAUMYA D



   AT 2308 16 by [email or web address]**

 

 31  **CALLED LIPASE TO SAUMYA D



   AT 2308 16 by [email or web address]**

 

 32  FIRST MORNING SPECIMENS GENERALLY CONTAIN THE HIGHEST



   CONCENTRATION OF HCG AND ARE RECOMMENDED FOR EARLY DETECTION



   OF PREGNANCY.

 

 33  Elevated levels of HbA1c suggest the need for more



   aggressive treatment of glycemia.



   The American Diabetes Association recommends that a primary



   goal of therapy should be a HbA1c of <7% and that physicians



   should re-evaluate the treatment regimen in patients with



   HbA1c values consistently >8%.

 

 34  Note:



   Persistent reduction for 3 months or more in an eGFR <60



   mL/min/1.73 m2 defines CKD.  Patients with eGFR values >/=60



   mL/min/1.73 m2 may also have CKD if evidence of persistent



   proteinuria is present.



   The original MDRD equation for estimated GFR is not valid



   for patients less than 18 years of age.



   Additional information may be found at www.[email or web address].

 

 35  Values below the stated reference ranges of AST and ALT can



   be seen in normal populations. Clinical correlation is



   suggested.

 

 36  Note:



   Persistent reduction for 3 months or more in an eGFR <60



   mL/min/1.73 m2 defines CKD.  Patients with eGFR values >/=60



   mL/min/1.73 m2 may also have CKD if evidence of persistent



   proteinuria is present.



   The original MDRD equation for estimated GFR is not valid



   for patients less than 18 years of age.



   Additional information may be found at www.[email or web address].

 

 37  Elevated levels of HbA1c suggest the need for more



   aggressive treatment of glycemia.



   The American Diabetes Association recommends that a primary



   goal of therapy should be a HbA1c of <7% and that physicians



   should re-evaluate the treatment regimen in patients with



   HbA1c values consistently >8%.

 

 38  Note:



   Persistent reduction for 3 months or more in an eGFR <60



   mL/min/1.73 m2 defines CKD.  Patients with eGFR values >/=60



   mL/min/1.73 m2 may also have CKD if evidence of persistent



   proteinuria is present.



   The original MDRD equation for estimated GFR is not valid



   for patients less than 18 years of age.



   Additional information may be found at www.[email or web address].

 

 39  Elevated levels of HbA1c suggest the need for more



   aggressive treatment of glycemia.



   The American Diabetes Association recommends that a primary



   goal of therapy should be a HbA1c of <7% and that physicians



   should re-evaluate the treatment regimen in patients with



   HbA1c values consistently >8%.

 

 40  NORMAL THROAT LOWELL

 

 41  A1c value between 5.7% and 6.4% is considered at increased risk for 
diabetes. A1c



   value greater than 6.5 % is considered essentially diagnostic for Type II



   diabetes. Current guidelines recommend a treatment goal of <7% for diabetic



   patients. This method will measure glycosylated hemoglobin variants, HbS, HbG
,



   HbH, HbWayne, HbC, HbE, etc. Other hemoglobin- opathies may give incorrect 
results



   with this test.

 

 42  Note: Persistent reduction for 3 months or more in an eGFR <60 mL/min/1.73 
m2



   defines CKD. Patients with eGFR values >/=60 mL/min/1.73 m2 may also have 
CKD if



   evidence of persistent proteinuria is present. The original MDRD equation for



   estimated GFR is not valid for patients less than 18 years of age. Additional



   information may be found at www.[email or web address].

 

 43  A1c value between 5.7% and 6.4% is considered at increased risk for 
diabetes. A1c



   value greater than 6.5 % is considered essentially diagnostic for Type II



   diabetes. Current guidelines recommend a treatment goal of <7% for diabetic



   patients. This method will measure glycosylated hemoglobin variants, HbS, HbG
,



   HbH, HbWayne, HbC, HbE, etc. Other hemoglobin- opathies may give incorrect 
results



   with this test.

 

 44  Note: Persistent reduction for 3 months or more in an eGFR <60 mL/min/1.73 
m2



   defines CKD. Patients with eGFR values >/=60 mL/min/1.73 m2 may also have 
CKD if



   evidence of persistent proteinuria is present. The original MDRD equation for



   estimated GFR is not valid for patients less than 18 years of age. Additional



   information may be found at [email or web address].

 

 45  Negative <0.9 Equivocal 0.9 - 1.0 Positive >1.0

 

 46  Negative <0.9 Equivocal 0.9 - 1.0 Positive >1.0

 

 47  Negative <0.9 Equivocal 0.9 - 1.0 Positive >1.0

 

 48  EBV Interpretation Chart Interpretation VCA-IgM EA-IgG VCA-IgG NA-ABS 
Susceptible



   - - - - Acute Infection + +or- +or- - Convalescent Phase +or- +or- + + 
Chronic or



   Reactivated - + + +or- Old Infection - - +or- + + Antibody Present - Antibody



   Absent Performed at:  - [facility] [address] 
[id number]



   : Araceli B Reyes MD, Phone: [phone]

 

 49  Negative <0.9 Equivocal 0.9 - 1.0 Positive >1.0

 

 50  The sensitivity of Heterophile antibody testing is 80-90%. Tai Lewis IgM



   testing offers higher sensitivity. Performed at: [facility] [id number] : Araceli B Reyes MD, Phone: 
[phone]

 

 50  A1c value between 5.7% and 6.4% is considered at increased risk for 
diabetes. A1c



   value greater than 6.5 % is considered essentially diagnostic for Type II



   diabetes. Current guidelines recommend a treatment goal of <7% for diabetic



   patients. This method will measure glycosylated hemoglobin variants, HbS, HbG
,



   HbH, HbWayne, HbC, HbE, etc. Other hemoglobin- opathies may give incorrect 
results



   with this test.

 

 51  A1c value between 5.7% and 6.4% is considered at increased risk for 
diabetes. A1c



   value greater than 6.5 % is considered essentially diagnostic for Type II



   diabetes. Current guidelines recommend a treatment goal of <7% for diabetic



   patients. This method will measure glycosylated hemoglobin variants, HbS, HbG
,



   HbH, HbWayne, HbC, HbE, etc. Other hemoglobin- opathies may give incorrect 
results



   with this test.

 

 52  A1c value between 5.7% and 6.4% is considered at increased risk for 
diabetes. A1c



   value greater than 6.5 % is considered essentially diagnostic for Type II



   diabetes. Current guidelines recommend a treatment goal of <7% for diabetic



   patients. This method will measure glycosylated hemoglobin variants, HbS, HbG
,



   HbH, HbWayne, HbC, HbE, etc. Other hemoglobin- opathies may give incorrect 
results



   with this test.

 

 53  3 mos [facility]

 

 54  A1c value between 5.7% and 6.4% is considered at increased risk for 
diabetes. A1c



   value greater than 6.5 % is considered essentially diagnostic for Type II



   diabetes. Current guidelines recommend a treatment goal of <7% for diabetic



   patients. This method will measure glycosylated hemoglobin variants, HbS, HbG
,



   HbH, HbWayne, HbC, HbE, etc. Other hemoglobin- opathies may give incorrect 
results



   with this test.

 

 55  NORMALLY MENSTRUATING FEMALES: Follicular Phase:...............4-13 mIU/mL



   Mid-Cycle Peak:.................5-22 mIU/mL Luteal Phase:...................2
-13



   mIU/mL Postmenopausal Female:........ mIU/mL

 

 56  Current guidelines recommend a treatment goal of <7% for diabetic 
patients. This



   method will measure glycosylated hemoglobin variants, HbS, HbG, HbH, HbWayne
, HbC,



   HbE, etc. Other hemoglobin- opathies may give incorrect results with this 
test.



   **Note change in expected values for healthy individuals**

 

 57  Performed at:  - [facility] [id number] 
Lab



   Director: Francisco Valadez MD, Phone: [phone]

 

 58  NORMALLY MENSTRUATING FEMALES: Follicular Phase.............1-18 mIU/mL Mid
-Cycle



   Peak............. mIU/mL Luteal Phase...............0.4-20 mIU/mL



   Postmenopausal .............15-62 mIU/mL

 

 59  NORMAL THROAT LOWELL

 

 60  Male Female 5 days old 0.6 - 19.8 0.6 - 19.8 2 mos-13 yrs. 2.4 - 22.9 2.4 
- 22.9



   14-15 yrs. 2.5 - 22.9 2.4 - 28.6 Adult (AM) 4.3 - 22.4 4.3 - 22.4 Adult (PM) 
3.1 -



   16.7 3.1 - 16.7







Procedures







 Date  Code  Description  Status

 

 2018  28741  Brief Emotional/Behav Assessment W/ Scoring Doc Per  
Completed



     Standard Inst  

 

 02/10/2016  28848  Measure Blood Oxygen Level Single Determination  Completed

 

 04/10/2015  47483  X-Ray Foot Complete  Completed

 

 04/10/2015  22292  X-Ray Ankle Complete  Completed

 

 2014  42837  Visual Screening Test  Completed

 

 2014  29333  Screening Hearing Test  Completed

 

 2012  88947  Measure Blood Oxygen Level Single Determination  Completed

 

 2012  96278  Measure Blood Oxygen Level Single Determination  Completed

 

 10/05/2010  57458  Measure Blood Oxygen Level Single Determination  Completed

 

 10/05/2010  43087  Electrocardiogram Complete  Completed

 

 2007  44503  Visual Screening Test  Completed

 

 2007  03063  Screening Hearing Test  Completed







Encounters







 Type  Date  Location  Provider  Dx  Diagnosis

 

 Office Visit  2018  [facility]  Veronica Wong MD  F32.0  Major depressive



   3:30p        disorder, single



           episode, mild









 R51  Headache

 

 R73.01  Impaired fasting glucose

 

 I10  Essential (primary) hypertension

 

 Z68.43  Body mass index (BMI) 50-59.9 , adult









 Office Visit  2018  9:45a  [facility]  Veronica Wong MD  R10.84  Generalized 
abdominal



           pain









 R19.7  Diarrhea, unspecified









 Office Visit  2017  4:15p  [facility]  Nellie Parsons PA  R10.84  Generalized 
abdominal



           pain









 J01.00  Acute maxillary sinusitis, unspecified









 Office Visit  2017  1:15p  [facility]  Veronica Wong MD  R51  Headache









 F32.0  Major depressive disorder, single episode, mild

 

 R73.01  Impaired fasting glucose

 

 I10  Essential (primary) hypertension









 Office Visit  2017  9:30a  [facility]  Veronica Wong MD  R19.7  Diarrhea, 
unspecified

 

 Office Visit  2017  9:30a  [facility]  Veronica Wong MD  F32.0  Major 
depressive



           disorder, single episode,



           mild









 R51  Headache

 

 R73.01  Impaired fasting glucose

 

 I10  Essential (primary) hypertension









 Office Visit  2017  1:30p  [facility]  Jennifer Lemus  Z11.1  Encounter for 
screening



           for respiratory



           tuberculosis

 

 Office Visit  2017 11:00a  [facility]  Veronica Wong MD  Z11.1  Encounter for 
screening



           for respiratory



           tuberculosis









 Z00.00  Encntr for general adult medical exam w/o abnormal findings









 Office Visit  11/10/2016  9:00a  [facility]  Veronica Wong MD  R51  Headache









 F32.0  Major depressive disorder, single episode, mild

 

 R73.01  Impaired fasting glucose

 

 I10  Essential (primary) hypertension

 

 Z30.41  Encounter for surveillance of contraceptive pills









 Office Visit  2016  9:00a  [facility]  Veronica Wong MD  K85.1  Biliary acute



           pancreatitis









 R73.01  Impaired fasting glucose

 

 I10  Essential (primary) hypertension

 

 F32.0  Major depressive disorder, single episode, mild

 

 R51  Headache

 

 N76.0  Acute vaginitis









 Office Visit  2016  8:45a  [facility]  Veronica Wong MD  F32.0  Major 
depressive



           disorder, single episode,



           mild









 R73.01  Impaired fasting glucose

 

 I10  Essential (primary) hypertension

 

 Z68.42  Body mass index (BMI) 45.0-49.9, adult

 

 Z71.9  Counseling, unspecified

 

 R05  Cough









 Office Visit  2016  3:00p  [facility]  Cori Palmer,  R21  Rash and 
other



       NP    nonspecific skin



           eruption

 

 Office Visit  2016  9:15a  [facility]  Veronica Wong MD  J01.90  Acute 
sinusitis,



           unspecified









 F32.0  Major depressive disorder, single episode, mild

 

 R73.01  Impaired fasting glucose

 

 I10  Essential (primary) hypertension

 

 M25.579  Pain in unspecified ankle and joints of unspecified foot









 Office Visit  02/10/2016  2:30p  [facility]  Cori Palmer,  J01.90  Acute 
sinusitis,



       NP    unspecified

 

 Office Visit  12/15/2015  9:15a  [facility]  Veronica Wong MD  F32.0  Major 
depressive



           disorder, single



           episode, mild

 

 Office Visit  2015  9:30a  [facility]  Veronica Wong MD  R51  Headache









 F32.0  Major depressive disorder, single episode, mild

 

 R73.01  Impaired fasting glucose

 

 I10  Essential (primary) hypertension

 

 M25.579  Pain in unspecified ankle and joints of unspecified foot

 

 Z68.42  Body mass index (BMI) 45.0-49.9, adult









 Office Visit  10/08/2015  3:30p  [facility]  Allan, Nurses  Z11.1  Encounter for 
screening



           for respiratory



           tuberculosis

 

 Office Visit  10/06/2015  2:45p  [facility]  Veronica Wong MD  V74.1  Screening 
Examination



           Pulmonary Tuberculosis









 Z11.1  Encounter for screening for respiratory tuberculosis

 

 Z00.00  Encntr for general adult medical exam w/o abnormal findings









 Office Visit  2015 10:00a  [facility]  Veronica Wong MD  719.47  Pain Joint 
Ankle & Foot









 784.0  Headache

 

 296.21  Depressive Disorder Major Single Episode Mild

 

 790.21  Impaired Fasting Glucose

 

 401.1  Hypertension Benign

 

 V85.42  BMI Body Mass Index 45.0-49.9, Adult









 Office Visit  04/10/2015 11:00a  [facility]  Veronica Wong MD  719.47  Pain Joint 
Ankle & Foot

 

 Office Visit  2015  9:45a  [facility]  Veronica Wong MD  784.0  Headache









 296.21  Depressive Disorder Major Single Episode Mild

 

 790.21  Impaired Fasting Glucose

 

 401.1  Hypertension Benign

 

 278.00  Obesity Unspec







Plan of Treatment

Future Appointment(s):2019  3:00 pm - Veronica Wong MD at [facility]2018 
- Veronica Wong MDR51 HeadacheComments:not a problem at this time.F32.0 Major 
depressive disorder, single episode, mildComments:doing well on current 
medication - continue this.  refill todayFollow up:6 mo f/u with nonfasting 
labs fvpwjC41.01 Impaired fasting glucoseNew Labs:Basic (BMP), Scheduled: Hemoglobin A1c, Scheduled: 19Comments:in the prediabetic range and 
worsening - continue to work on healthy lifestyle - medication is not 
needed.I10 Essential (primary) hypertensionComments:at goal on current 
medication - continue thisZ68.43 Body mass index (BMI) 50-59.9, adultComments:
the BMI is the ratio between height and weight .  the goal BMI for your age is 
between 18 and 25.  This means you are overweight.  Recommend weight loss with 
healthy diet and regular exercise.  150 minof exercise weekly is the goal.

## 2019-01-08 NOTE — UC
Complaint Female HPI





- HPI Summary


HPI Summary: 





Pt presents with concern for sTD exposure.  Pt denies any gyn or urinary 

symptoms but states that she has not been "tested in awhile" and has had 3 new 

partners in the last year. 





- History Of Current Complaint


Chief Complaint: UCGeneralIllness


Stated Complaint: PERSONAL


Time Seen by Provider: 01/08/19 16:59


Hx Obtained From: Patient


Hx Last Menstrual Period: 12/27/18-on BCP


Pregnant?: No


Severity Currently: None


Pain Intensity: 0


Character: Not Applicable


Aggravating Factor(s): Nothing


Alleviating Factor(s): Nothing


Associated Signs And Symptoms: Positive: Negative





- Risk Factors


Ectopic Pregnancy Risk Factor: Negative


Ovarian Torsion Risk Factor: Reproductive Age





- Allergies/Home Medications


Allergies/Adverse Reactions: 


 Allergies











Allergy/AdvReac Type Severity Reaction Status Date / Time


 


amoxicillin Allergy  Rash Verified 01/08/19 17:00


 


Sulfa (Sulfonamide Allergy  Rash Verified 01/08/19 17:00





Antibiotics)     














PMH/Surg Hx/FS Hx/Imm Hx


Previously Healthy: Yes





- Surgical History


Surgical History: Yes


Surgery Procedure, Year, and Place: gallbladder 8/2016





- Family History


Known Family History: 


   Negative: Cardiac Disease





- Social History


Occupation: Employed Full-time


Lives: With Family


Alcohol Use: Occasionally


Substance Use Type: None


Smoking Status (MU): Never Smoked Tobacco


Have You Smoked in the Last Year: No





Review of Systems


All Other Systems Reviewed And Are Negative: Yes


Constitutional: Positive: Negative


Skin: Positive: Negative


Eyes: Positive: Negative


ENT: Positive: Negative


Respiratory: Positive: Negative


Cardiovascular: Positive: Negative


Gastrointestinal: Positive: Negative


Genitourinary: Positive: Negative


Motor: Positive: Negative


Neurovascular: Positive: Negative


Musculoskeletal: Positive: Negative


Neurological: Positive: Negative


Psychological: Positive: Negative


Is Patient Immunocompromised?: No





Physical Exam


Triage Information Reviewed: Yes


Appearance: Well-Appearing


Vital Signs: 


 Initial Vital Signs











Temp  97.7 F   01/08/19 16:54


 


Pulse  78   01/08/19 16:54


 


Resp  18   01/08/19 16:54


 


BP  144/77   01/08/19 16:54


 


Pulse Ox  99   01/08/19 16:54











Vital Signs Reviewed: Yes


Eye Exam: Normal


ENT Exam: Normal


Dental Exam: Normal


Respiratory Exam: Normal


Cardiovascular Exam: Normal


Abdominal Exam: Normal


Abdomen Description: Positive: Nontender


Musculoskeletal Exam: Normal


Neurological Exam: Normal


Psychological Exam: Normal


Skin Exam: Normal





 Complaint Female Dx





- Differential Dx/Diagnosis


Differential Diagnosis/HQI/PQRI: Sexually Transmitted Disease, Urinary Tract 

Infection


Provider Diagnosis: 


 Encounter for sexually transmitted disease counseling








Discharge





- Sign-Out/Discharge


Documenting (check all that apply): Patient Departure


All imaging exams completed and their final reports reviewed: No Studies





- Discharge Plan


Condition: Stable


Disposition: HOME


Patient Education Materials:  Sexually Transmitted Diseases (ED), Safe Sex (ED)


Referrals: 


Veronica Wong MD [Primary Care Provider] - As Soon As Possible





- Billing Disposition and Condition


Condition: STABLE


Disposition: Home

## 2019-01-10 NOTE — ED
Progress





- Progress Note


Progress Note: 





The patient is positive for chlamydia, please call and inform and notify to 

 the prescription and take the medicine as directed.  1000mg of 

zithromax times one dose. 





Course/Dx





- Diagnoses


Provider Diagnoses: 


 Encounter for sexually transmitted disease counseling








Discharge





- Sign-Out/Discharge


Documenting (check all that apply): Patient Departure


All imaging exams completed and their final reports reviewed: No Studies





- Discharge Plan


Condition: Stable


Disposition: HOME


Prescriptions: 


Azithromycin TAB* [Zithromax TAB (Z-JOSEPH) 250 mg #6 tabs] 1,000 mg PO ONCE #4 tab


Patient Education Materials:  Sexually Transmitted Diseases (ED), Safe Sex (ED)


Referrals: 


Veronica Wong MD [Primary Care Provider] - As Soon As Possible





- Billing Disposition and Condition


Condition: STABLE


Disposition: Home

## 2019-10-13 ENCOUNTER — HOSPITAL ENCOUNTER (EMERGENCY)
Dept: HOSPITAL 25 - UCCORT | Age: 24
Discharge: HOME | End: 2019-10-13
Payer: COMMERCIAL

## 2019-10-13 VITALS — DIASTOLIC BLOOD PRESSURE: 69 MMHG | SYSTOLIC BLOOD PRESSURE: 127 MMHG

## 2019-10-13 DIAGNOSIS — Z88.0: ICD-10-CM

## 2019-10-13 DIAGNOSIS — N39.0: Primary | ICD-10-CM

## 2019-10-13 DIAGNOSIS — Z88.2: ICD-10-CM

## 2019-10-13 PROCEDURE — 99212 OFFICE O/P EST SF 10 MIN: CPT

## 2019-10-13 PROCEDURE — 81003 URINALYSIS AUTO W/O SCOPE: CPT

## 2019-10-13 PROCEDURE — 87086 URINE CULTURE/COLONY COUNT: CPT

## 2019-10-13 PROCEDURE — 84702 CHORIONIC GONADOTROPIN TEST: CPT

## 2019-10-13 PROCEDURE — 87077 CULTURE AEROBIC IDENTIFY: CPT

## 2019-10-13 PROCEDURE — G0463 HOSPITAL OUTPT CLINIC VISIT: HCPCS

## 2019-10-13 PROCEDURE — 87186 SC STD MICRODIL/AGAR DIL: CPT

## 2019-10-13 NOTE — UC
Complaint Female HPI





- HPI Summary


HPI Summary: 





Pt presents with c/o urinary frequency X 1 day.  





- History Of Current Complaint


Chief Complaint: UCGU


Stated Complaint: UTI SYMPTOMS


Time Seen by Provider: 10/13/19 11:47


Hx Obtained From: Patient


Hx Last Menstrual Period: 10/1/19


Pregnant?: No


Onset/Duration: Sudden Onset, Lasting Days, Still Present


Timing: Constant


Severity Initially: Mild


Severity Currently: Mild


Pain Intensity: 0


Aggravating Factor(s): Urination


Alleviating Factor(s): Nothing


Associated Signs And Symptoms: Positive: Back Pain - low back ache





- Risk Factors


Ectopic Pregnancy Risk Factor: Negative


Ovarian Torsion Risk Factor: Reproductive Age





- Allergies/Home Medications


Allergies/Adverse Reactions: 


 Allergies











Allergy/AdvReac Type Severity Reaction Status Date / Time


 


amoxicillin Allergy  Rash Verified 10/13/19 11:37


 


Sulfa (Sulfonamide Allergy  Rash Verified 10/13/19 11:37





Antibiotics)     














PMH/Surg Hx/FS Hx/Imm Hx


Previously Healthy: Yes





- Surgical History


Surgical History: Yes


Surgery Procedure, Year, and Place: cholycystectomy 8/2016





- Family History


Known Family History: 


   Negative: Cardiac Disease





- Social History


Occupation: Employed Full-time


Lives: With Family


Alcohol Use: Occasionally


Substance Use Type: None


Smoking Status (MU): Never Smoked Tobacco


Have You Smoked in the Last Year: No





Review of Systems


All Other Systems Reviewed And Are Negative: Yes


Constitutional: Positive: Negative


Skin: Positive: Negative


Eyes: Positive: Negative


ENT: Positive: Negative


Respiratory: Positive: Negative


Cardiovascular: Positive: Negative


Gastrointestinal: Positive: Abdominal Pain


Genitourinary: Positive: Frequency, Urgency


Motor: Positive: Negative


Neurovascular: Positive: Negative


Musculoskeletal: Positive: Negative


Neurological: Positive: Negative


Psychological: Positive: Negative


Is Patient Immunocompromised?: No





Physical Exam


Triage Information Reviewed: Yes


Appearance: Well-Appearing, Obese


Vital Signs: 


 Initial Vital Signs











Temp  98.9 F   10/13/19 11:31


 


Pulse  75   10/13/19 11:31


 


Resp  18   10/13/19 11:31


 


BP  127/69   10/13/19 11:31


 


Pulse Ox  100   10/13/19 11:31











Vital Signs Reviewed: Yes


Eye Exam: Normal


ENT: Positive: Hearing grossly normal


Dental Exam: Normal


Neck exam: Normal


Respiratory Exam: Normal


Cardiovascular Exam: Normal


Abdominal Exam: Normal


Abdomen Description: Positive: Nontender


Musculoskeletal Exam: Normal


Neurological Exam: Normal


Psychological Exam: Normal


Skin Exam: Normal





 Complaint Female Dx





- Differential Dx/Diagnosis


Differential Diagnosis/HQI/PQRI: Sexually Transmitted Disease, Urinary Tract 

Infection


Provider Diagnosis: 


 UTI (urinary tract infection)








Discharge ED





- Sign-Out/Discharge


Documenting (check all that apply): Patient Departure


All imaging exams completed and their final reports reviewed: No Studies





- Discharge Plan


Condition: Stable


Disposition: HOME


Prescriptions: 


Nitrofurantoin Monohyd/M-Cryst [Macrobid 100 mg Capsule] 100 mg PO Q12H #10 cap


Phenazopyridine TAB* [Pyridium 100 mg TAB*] 100 mg PO Q8H #3 tab


Patient Education Materials:  Urinary Tract Infection in Women (ED)


Referrals: 


Veronica Wong MD [Primary Care Provider] - If Needed





- Billing Disposition and Condition


Condition: STABLE


Disposition: Home

## 2019-10-16 NOTE — UC
- Progress Note


Progress Note: 





urine culture : + Proteus, Mirabilis resistance to Macrobid 


may stop Macrobid 


will call in cipro 500 mg bid x 5 days 








Course/Dx





- Diagnoses


Provider Diagnoses: 


 UTI (urinary tract infection)








Discharge ED





- Sign-Out/Discharge


Documenting (check all that apply): Patient Departure


All imaging exams completed and their final reports reviewed: No Studies





- Discharge Plan


Condition: Stable


Disposition: HOME


Prescriptions: 


Ciprofloxacin TAB* [Cipro 500 MG TAB*] 500 mg PO BID #10 tab


Nitrofurantoin Monohyd/M-Cryst [Macrobid 100 mg Capsule] 100 mg PO Q12H #10 cap


Phenazopyridine TAB* [Pyridium 100 mg TAB*] 100 mg PO Q8H #3 tab


Patient Education Materials:  Urinary Tract Infection in Women (ED)


Referrals: 


Veronica Wong MD [Primary Care Provider] - If Needed





- Billing Disposition and Condition


Condition: STABLE


Disposition: Home